# Patient Record
Sex: FEMALE | Race: WHITE | NOT HISPANIC OR LATINO | Employment: PART TIME | URBAN - METROPOLITAN AREA
[De-identification: names, ages, dates, MRNs, and addresses within clinical notes are randomized per-mention and may not be internally consistent; named-entity substitution may affect disease eponyms.]

---

## 2018-03-30 ENCOUNTER — WALK IN (OUTPATIENT)
Dept: URGENT CARE | Age: 68
End: 2018-03-30

## 2018-03-30 VITALS
SYSTOLIC BLOOD PRESSURE: 134 MMHG | WEIGHT: 146.8 LBS | HEART RATE: 71 BPM | DIASTOLIC BLOOD PRESSURE: 60 MMHG | TEMPERATURE: 97.8 F

## 2018-03-30 DIAGNOSIS — K04.7 TOOTH ABSCESS: Primary | ICD-10-CM

## 2018-03-30 PROCEDURE — 99202 OFFICE O/P NEW SF 15 MIN: CPT | Performed by: FAMILY MEDICINE

## 2018-03-30 RX ORDER — CHLORHEXIDINE GLUCONATE ORAL RINSE 1.2 MG/ML
15 SOLUTION DENTAL 2 TIMES DAILY
Qty: 473 ML | Refills: 0 | Status: SHIPPED | OUTPATIENT
Start: 2018-03-30

## 2018-03-30 RX ORDER — CLINDAMYCIN HYDROCHLORIDE 150 MG/1
300 CAPSULE ORAL 3 TIMES DAILY
Qty: 42 CAPSULE | Refills: 0 | Status: SHIPPED | OUTPATIENT
Start: 2018-03-30 | End: 2018-04-06

## 2023-03-27 PROBLEM — M35.9 DISORDER OF CONNECTIVE TISSUE (MULTI): Status: ACTIVE | Noted: 2023-03-27

## 2023-03-27 PROBLEM — S99.919A ANKLE INJURY: Status: ACTIVE | Noted: 2023-03-27

## 2023-03-27 PROBLEM — K21.9 GERD (GASTROESOPHAGEAL REFLUX DISEASE): Status: ACTIVE | Noted: 2023-03-27

## 2023-03-27 PROBLEM — M25.569 KNEE PAIN: Status: ACTIVE | Noted: 2023-03-27

## 2023-03-27 PROBLEM — M25.532 LEFT WRIST PAIN: Status: ACTIVE | Noted: 2023-03-27
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
## 2023-03-27 PROBLEM — R05.9 COUGH: Status: ACTIVE | Noted: 2023-03-27

## 2023-03-27 PROBLEM — H25.042 POSTERIOR SUBCAPSULAR POLAR AGE-RELATED CATARACT OF LEFT EYE: Status: ACTIVE | Noted: 2023-03-27

## 2023-03-27 PROBLEM — M54.9 ACUTE BACK PAIN: Status: ACTIVE | Noted: 2023-03-27

## 2023-03-27 PROBLEM — S51.819A LACERATION OF FOREARM: Status: ACTIVE | Noted: 2023-03-27

## 2023-03-27 PROBLEM — E04.1 THYROID NODULE: Status: ACTIVE | Noted: 2023-03-27

## 2023-03-27 PROBLEM — H25.12 AGE-RELATED NUCLEAR CATARACT OF LEFT EYE: Status: ACTIVE | Noted: 2023-03-27

## 2023-03-27 PROBLEM — H25.041 POSTERIOR SUBCAPSULAR POLAR AGE-RELATED CATARACT OF RIGHT EYE: Status: ACTIVE | Noted: 2023-03-27

## 2023-03-27 PROBLEM — R92.30 DENSE BREAST TISSUE ON MAMMOGRAM: Status: ACTIVE | Noted: 2023-03-27
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
## 2023-03-27 PROBLEM — E78.00 HYPERCHOLESTEROLEMIA: Status: ACTIVE | Noted: 2023-03-27

## 2023-03-27 PROBLEM — H92.09 EAR PAIN: Status: ACTIVE | Noted: 2023-03-27

## 2023-03-27 PROBLEM — R50.9 FEVER: Status: ACTIVE | Noted: 2023-03-27

## 2023-03-27 PROBLEM — M25.551 HIP PAIN, BILATERAL: Status: ACTIVE | Noted: 2023-03-27

## 2023-03-27 PROBLEM — T14.90XA TRAUMA: Status: ACTIVE | Noted: 2023-03-27

## 2023-03-27 PROBLEM — M79.605 PAIN OF LEFT LOWER EXTREMITY: Status: ACTIVE | Noted: 2023-03-27

## 2023-03-27 PROBLEM — J11.1 INFLUENZA: Status: ACTIVE | Noted: 2023-03-27

## 2023-03-27 PROBLEM — M79.671 PAIN IN BOTH FEET: Status: ACTIVE | Noted: 2023-03-27

## 2023-03-27 PROBLEM — M18.11 ARTHRITIS OF CARPOMETACARPAL (CMC) JOINT OF RIGHT THUMB: Status: ACTIVE | Noted: 2023-03-27

## 2023-03-27 PROBLEM — J02.9 SORE THROAT: Status: ACTIVE | Noted: 2023-03-27

## 2023-03-27 PROBLEM — M25.552 HIP PAIN, LEFT: Status: ACTIVE | Noted: 2023-03-27

## 2023-03-27 PROBLEM — R19.7 DIARRHEA: Status: ACTIVE | Noted: 2023-03-27

## 2023-03-27 PROBLEM — M06.9 RHEUMATOID ARTHRITIS (MULTI): Status: ACTIVE | Noted: 2023-03-27

## 2023-03-27 PROBLEM — M79.605 LEFT LEG PAIN: Status: ACTIVE | Noted: 2023-03-27

## 2023-03-27 PROBLEM — M79.10 MYALGIA: Status: ACTIVE | Noted: 2023-03-27

## 2023-03-27 PROBLEM — H25.11 AGE-RELATED NUCLEAR CATARACT OF RIGHT EYE: Status: ACTIVE | Noted: 2023-03-27

## 2023-03-27 PROBLEM — M79.644 PAIN OF RIGHT THUMB: Status: ACTIVE | Noted: 2023-03-27

## 2023-03-27 PROBLEM — E55.9 VITAMIN D DEFICIENCY: Status: ACTIVE | Noted: 2023-03-27

## 2023-03-27 PROBLEM — R25.2 CRAMPING OF HANDS: Status: ACTIVE | Noted: 2023-03-27

## 2023-03-27 PROBLEM — M79.672 PAIN IN BOTH FEET: Status: ACTIVE | Noted: 2023-03-27

## 2023-03-27 PROBLEM — M25.552 HIP PAIN, BILATERAL: Status: ACTIVE | Noted: 2023-03-27

## 2023-03-27 PROBLEM — M79.89 SWELLING OF LEFT HAND: Status: ACTIVE | Noted: 2023-03-27

## 2023-03-27 PROBLEM — R53.83 FATIGUE: Status: ACTIVE | Noted: 2023-03-27

## 2023-03-27 PROBLEM — N28.9 RENAL DISORDER: Status: ACTIVE | Noted: 2023-03-27

## 2023-03-27 PROBLEM — R06.00 DYSPNEA: Status: ACTIVE | Noted: 2023-03-27

## 2023-03-27 PROBLEM — M81.0 OSTEOPOROSIS, POST-MENOPAUSAL: Status: ACTIVE | Noted: 2023-03-27

## 2023-03-27 PROBLEM — R76.8 ANA POSITIVE: Status: ACTIVE | Noted: 2023-03-27

## 2023-03-28 ENCOUNTER — OFFICE VISIT (OUTPATIENT)
Dept: PRIMARY CARE | Facility: CLINIC | Age: 73
End: 2023-03-28
Payer: MEDICARE

## 2023-03-28 ENCOUNTER — TELEPHONE (OUTPATIENT)
Dept: PRIMARY CARE | Facility: CLINIC | Age: 73
End: 2023-03-28

## 2023-03-28 VITALS
SYSTOLIC BLOOD PRESSURE: 112 MMHG | OXYGEN SATURATION: 96 % | HEART RATE: 77 BPM | DIASTOLIC BLOOD PRESSURE: 62 MMHG | WEIGHT: 152 LBS | BODY MASS INDEX: 23.11 KG/M2

## 2023-03-28 DIAGNOSIS — S22.20XA CLOSED FRACTURE OF STERNUM, UNSPECIFIED PORTION OF STERNUM, INITIAL ENCOUNTER: Primary | ICD-10-CM
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
## 2023-03-28 DIAGNOSIS — Z79.01 ANTICOAGULATED: ICD-10-CM

## 2023-03-28 DIAGNOSIS — T14.8XXA HEMATOMA: ICD-10-CM

## 2023-03-28 PROCEDURE — 1159F MED LIST DOCD IN RCRD: CPT | Performed by: INTERNAL MEDICINE

## 2023-03-28 PROCEDURE — 1036F TOBACCO NON-USER: CPT | Performed by: INTERNAL MEDICINE

## 2023-03-28 PROCEDURE — 1160F RVW MEDS BY RX/DR IN RCRD: CPT | Performed by: INTERNAL MEDICINE

## 2023-03-28 PROCEDURE — 99496 TRANSJ CARE MGMT HIGH F2F 7D: CPT | Performed by: INTERNAL MEDICINE

## 2023-03-28 PROCEDURE — 99215 OFFICE O/P EST HI 40 MIN: CPT | Performed by: INTERNAL MEDICINE

## 2023-03-28 RX ORDER — OMEPRAZOLE 20 MG/1
CAPSULE, DELAYED RELEASE ORAL
COMMUNITY
Start: 2023-02-14 | End: 2023-08-22 | Stop reason: ALTCHOICE

## 2023-03-28 RX ORDER — ACETAMINOPHEN 500 MG
TABLET ORAL EVERY 8 HOURS
COMMUNITY
Start: 2023-03-20

## 2023-03-28 ASSESSMENT — ENCOUNTER SYMPTOMS
OCCASIONAL FEELINGS OF UNSTEADINESS: 0
DEPRESSION: 0
LOSS OF SENSATION IN FEET: 0
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
## 2023-05-01 DIAGNOSIS — T14.8XXA HEMATOMA: ICD-10-CM

## 2023-05-02 PROBLEM — R74.8 HIGH LEVEL OF CARDIAC MARKER: Status: ACTIVE | Noted: 2023-05-02

## 2023-05-02 PROBLEM — H68.019: Status: ACTIVE | Noted: 2023-05-02

## 2023-05-02 PROBLEM — S27.892A MEDIASTINAL HEMATOMA: Status: ACTIVE | Noted: 2023-05-02

## 2023-05-02 PROBLEM — S22.22XA CLOSED FRACTURE OF BODY OF STERNUM: Status: ACTIVE | Noted: 2023-05-02

## 2023-05-02 PROBLEM — S27.321A CONTUSION OF RIGHT LUNG: Status: ACTIVE | Noted: 2023-05-02

## 2023-05-02 PROBLEM — D41.01 NEOPLASM OF UNCERTAIN BEHAVIOR OF RIGHT KIDNEY: Status: ACTIVE | Noted: 2023-05-02

## 2023-05-02 PROBLEM — J20.9 ACUTE BRONCHITIS: Status: ACTIVE | Noted: 2023-05-02

## 2023-05-02 PROBLEM — I77.3 ARTERIAL FIBROMUSCULAR DYSPLASIA (CMS-HCC): Status: ACTIVE | Noted: 2023-05-02

## 2023-05-02 RX ORDER — VIT A/VIT C/VIT E/ZINC/COPPER 2148-113
TABLET ORAL
COMMUNITY

## 2023-05-03 ENCOUNTER — OFFICE VISIT (OUTPATIENT)
Dept: PRIMARY CARE | Facility: CLINIC | Age: 73
End: 2023-05-03
Payer: MEDICARE

## 2023-05-03 VITALS
SYSTOLIC BLOOD PRESSURE: 106 MMHG | DIASTOLIC BLOOD PRESSURE: 58 MMHG | OXYGEN SATURATION: 98 % | HEART RATE: 73 BPM | BODY MASS INDEX: 23.63 KG/M2 | WEIGHT: 155.4 LBS

## 2023-05-03 DIAGNOSIS — Z79.01 ANTICOAGULATED: ICD-10-CM

## 2023-05-03 DIAGNOSIS — K21.9 GASTROESOPHAGEAL REFLUX DISEASE WITHOUT ESOPHAGITIS: ICD-10-CM

## 2023-05-03 DIAGNOSIS — F43.10 PTSD (POST-TRAUMATIC STRESS DISORDER): Primary | ICD-10-CM

## 2023-05-03 DIAGNOSIS — S22.20XD CLOSED FRACTURE OF STERNUM WITH ROUTINE HEALING, UNSPECIFIED PORTION OF STERNUM, SUBSEQUENT ENCOUNTER: ICD-10-CM
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
## 2023-05-03 PROCEDURE — 1036F TOBACCO NON-USER: CPT | Performed by: INTERNAL MEDICINE

## 2023-05-03 PROCEDURE — 1160F RVW MEDS BY RX/DR IN RCRD: CPT | Performed by: INTERNAL MEDICINE

## 2023-05-03 PROCEDURE — 99214 OFFICE O/P EST MOD 30 MIN: CPT | Performed by: INTERNAL MEDICINE

## 2023-05-03 PROCEDURE — 1159F MED LIST DOCD IN RCRD: CPT | Performed by: INTERNAL MEDICINE

## 2023-05-03 RX ORDER — DULOXETIN HYDROCHLORIDE 30 MG/1
30 CAPSULE, DELAYED RELEASE ORAL DAILY
Qty: 30 CAPSULE | Refills: 0 | Status: SHIPPED | OUTPATIENT
Start: 2023-05-03 | End: 2023-06-05 | Stop reason: SDUPTHER
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
## 2023-05-28 DIAGNOSIS — T14.8XXA HEMATOMA: ICD-10-CM

## 2023-05-29 RX ORDER — APIXABAN 2.5 MG/1
TABLET, FILM COATED ORAL
Qty: 60 TABLET | Refills: 0 | Status: SHIPPED | OUTPATIENT
Start: 2023-05-29 | End: 2023-07-27 | Stop reason: SDUPTHER

## 2023-06-05 ENCOUNTER — OFFICE VISIT (OUTPATIENT)
Dept: PRIMARY CARE | Facility: CLINIC | Age: 73
End: 2023-06-05
Payer: MEDICARE

## 2023-06-05 VITALS
OXYGEN SATURATION: 96 % | WEIGHT: 153.4 LBS | BODY MASS INDEX: 23.32 KG/M2 | HEART RATE: 73 BPM | DIASTOLIC BLOOD PRESSURE: 72 MMHG | SYSTOLIC BLOOD PRESSURE: 130 MMHG

## 2023-06-05 DIAGNOSIS — K21.9 GASTROESOPHAGEAL REFLUX DISEASE WITHOUT ESOPHAGITIS: ICD-10-CM

## 2023-06-05 DIAGNOSIS — G89.29 CHRONIC PAIN OF LEFT KNEE: ICD-10-CM

## 2023-06-05 DIAGNOSIS — F43.10 PTSD (POST-TRAUMATIC STRESS DISORDER): Primary | ICD-10-CM

## 2023-06-05 DIAGNOSIS — I77.3 FIBROMUSCULAR DYSPLASIA (CMS-HCC): ICD-10-CM

## 2023-06-05 DIAGNOSIS — M25.562 CHRONIC PAIN OF LEFT KNEE: ICD-10-CM

## 2023-06-05 PROCEDURE — 1036F TOBACCO NON-USER: CPT | Performed by: INTERNAL MEDICINE

## 2023-06-05 PROCEDURE — 99214 OFFICE O/P EST MOD 30 MIN: CPT | Performed by: INTERNAL MEDICINE

## 2023-06-05 PROCEDURE — 1160F RVW MEDS BY RX/DR IN RCRD: CPT | Performed by: INTERNAL MEDICINE

## 2023-06-05 PROCEDURE — 1159F MED LIST DOCD IN RCRD: CPT | Performed by: INTERNAL MEDICINE

## 2023-06-05 RX ORDER — DULOXETIN HYDROCHLORIDE 30 MG/1
30 CAPSULE, DELAYED RELEASE ORAL DAILY
Qty: 90 CAPSULE | Refills: 0 | Status: SHIPPED | OUTPATIENT
Start: 2023-06-05 | End: 2023-09-11 | Stop reason: SDUPTHER
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
## 2023-06-19 ENCOUNTER — TELEPHONE (OUTPATIENT)
Dept: PRIMARY CARE | Facility: CLINIC | Age: 73
End: 2023-06-19
Payer: MEDICARE

## 2023-06-19 NOTE — TELEPHONE ENCOUNTER
Patient called in this morning wanting advice. Patient is having rectal bleeding/stomach pain patient headed to ER per MARITZA.

## 2023-06-23 ENCOUNTER — TELEPHONE (OUTPATIENT)
Dept: PRIMARY CARE | Facility: CLINIC | Age: 73
End: 2023-06-23
Payer: MEDICARE

## 2023-06-23 PROBLEM — N39.0 UTI (URINARY TRACT INFECTION): Status: ACTIVE | Noted: 2023-06-23

## 2023-06-23 PROBLEM — K62.5 RECTAL BLEEDING: Status: ACTIVE | Noted: 2023-06-23

## 2023-06-23 PROBLEM — R10.9 ABDOMINAL PAIN: Status: ACTIVE | Noted: 2023-06-23

## 2023-06-23 RX ORDER — CEPHALEXIN 500 MG/1
500 CAPSULE ORAL EVERY 12 HOURS
COMMUNITY
End: 2023-08-22 | Stop reason: ALTCHOICE

## 2023-06-23 NOTE — TELEPHONE ENCOUNTER
Patient states that Duloxetine medication may be the cause of diarrhea, liquid bowel movements, nausea, stomach issues.    She is stopping this medication and is requesting results of tests that were run at ED on 6-.    Patient will be scheduled to come into office this week to go over these issues.

## 2023-06-26 ENCOUNTER — OFFICE VISIT (OUTPATIENT)
Dept: PRIMARY CARE | Facility: CLINIC | Age: 73
End: 2023-06-26
Payer: MEDICARE

## 2023-06-26 VITALS
DIASTOLIC BLOOD PRESSURE: 70 MMHG | BODY MASS INDEX: 22.62 KG/M2 | SYSTOLIC BLOOD PRESSURE: 112 MMHG | OXYGEN SATURATION: 98 % | WEIGHT: 148.8 LBS | HEART RATE: 70 BPM

## 2023-06-26 DIAGNOSIS — R19.7 DIARRHEA, UNSPECIFIED TYPE: Primary | ICD-10-CM

## 2023-06-26 DIAGNOSIS — F43.10 PTSD (POST-TRAUMATIC STRESS DISORDER): ICD-10-CM

## 2023-06-26 DIAGNOSIS — S22.20XD CLOSED FRACTURE OF STERNUM WITH ROUTINE HEALING, UNSPECIFIED PORTION OF STERNUM, SUBSEQUENT ENCOUNTER: ICD-10-CM

## 2023-06-26 DIAGNOSIS — K21.9 GASTROESOPHAGEAL REFLUX DISEASE WITHOUT ESOPHAGITIS: ICD-10-CM

## 2023-06-26 PROCEDURE — 1036F TOBACCO NON-USER: CPT | Performed by: INTERNAL MEDICINE

## 2023-06-26 PROCEDURE — 99214 OFFICE O/P EST MOD 30 MIN: CPT | Performed by: INTERNAL MEDICINE

## 2023-06-26 PROCEDURE — 1160F RVW MEDS BY RX/DR IN RCRD: CPT | Performed by: INTERNAL MEDICINE

## 2023-06-26 PROCEDURE — 3008F BODY MASS INDEX DOCD: CPT | Performed by: INTERNAL MEDICINE

## 2023-06-26 PROCEDURE — 1159F MED LIST DOCD IN RCRD: CPT | Performed by: INTERNAL MEDICINE

## 2023-06-27 ENCOUNTER — LAB (OUTPATIENT)
Dept: LAB | Facility: LAB | Age: 73
End: 2023-06-27
Payer: MEDICARE

## 2023-06-27 DIAGNOSIS — R19.7 DIARRHEA, UNSPECIFIED TYPE: ICD-10-CM

## 2023-06-27 PROCEDURE — 87493 C DIFF AMPLIFIED PROBE: CPT

## 2023-06-28 LAB — C. DIFFICILE TOXIN, PCR: NOT DETECTED

## 2023-07-27 DIAGNOSIS — T14.8XXA HEMATOMA: ICD-10-CM

## 2023-08-10 PROBLEM — M25.562 KNEE PAIN, LEFT: Status: ACTIVE | Noted: 2023-08-10

## 2023-08-10 PROBLEM — I77.3 FIBROMUSCULAR DYSPLASIA (CMS-HCC): Status: ACTIVE | Noted: 2023-08-10

## 2023-08-10 PROBLEM — I65.23 BILATERAL CAROTID ARTERY STENOSIS: Status: ACTIVE | Noted: 2023-08-10

## 2023-08-10 PROBLEM — I70.0 AORTIC ATHEROSCLEROSIS (CMS-HCC): Status: ACTIVE | Noted: 2023-08-10

## 2023-08-10 PROBLEM — I83.93 VARICOSE VEINS OF LEGS: Status: ACTIVE | Noted: 2023-08-10

## 2023-08-10 PROBLEM — R29.2 HYPERREFLEXIC: Status: ACTIVE | Noted: 2023-08-10

## 2023-08-10 RX ORDER — ASPIRIN 81 MG/1
TABLET ORAL
COMMUNITY

## 2023-08-16 PROBLEM — E04.1 NODULAR THYROID DISEASE: Status: ACTIVE | Noted: 2023-08-16

## 2023-08-16 PROBLEM — E04.2 NONTOXIC MULTINODULAR GOITER: Status: ACTIVE | Noted: 2023-08-16

## 2023-08-16 PROBLEM — N95.1 MENOPAUSAL SYMPTOM: Status: ACTIVE | Noted: 2023-08-16

## 2023-08-17 PROBLEM — H90.3 SENSORINEURAL HEARING LOSS, BILATERAL: Status: ACTIVE | Noted: 2023-08-17

## 2023-08-22 ENCOUNTER — LAB (OUTPATIENT)
Dept: LAB | Facility: LAB | Age: 73
End: 2023-08-22
Payer: MEDICARE

## 2023-08-22 ENCOUNTER — OFFICE VISIT (OUTPATIENT)
Dept: PRIMARY CARE | Facility: CLINIC | Age: 73
End: 2023-08-22
Payer: MEDICARE

## 2023-08-22 VITALS
BODY MASS INDEX: 23.69 KG/M2 | HEIGHT: 66 IN | WEIGHT: 147.4 LBS | SYSTOLIC BLOOD PRESSURE: 136 MMHG | HEART RATE: 64 BPM | OXYGEN SATURATION: 98 % | DIASTOLIC BLOOD PRESSURE: 82 MMHG

## 2023-08-22 DIAGNOSIS — Z00.00 ROUTINE GENERAL MEDICAL EXAMINATION AT HEALTH CARE FACILITY: ICD-10-CM

## 2023-08-22 DIAGNOSIS — Z00.00 ANNUAL PHYSICAL EXAM: ICD-10-CM

## 2023-08-22 DIAGNOSIS — E55.9 VITAMIN D DEFICIENCY: ICD-10-CM

## 2023-08-22 DIAGNOSIS — Z12.31 ENCOUNTER FOR SCREENING MAMMOGRAM FOR MALIGNANT NEOPLASM OF BREAST: ICD-10-CM

## 2023-08-22 DIAGNOSIS — Z00.00 ENCOUNTER FOR ANNUAL WELLNESS VISIT (AWV) IN MEDICARE PATIENT: Primary | ICD-10-CM

## 2023-08-22 DIAGNOSIS — R53.83 OTHER FATIGUE: ICD-10-CM

## 2023-08-22 DIAGNOSIS — F43.10 PTSD (POST-TRAUMATIC STRESS DISORDER): ICD-10-CM

## 2023-08-22 DIAGNOSIS — I77.3 FIBROMUSCULAR DYSPLASIA (CMS-HCC): ICD-10-CM

## 2023-08-22 LAB
ALANINE AMINOTRANSFERASE (SGPT) (U/L) IN SER/PLAS: 12 U/L (ref 7–45)
ALBUMIN (G/DL) IN SER/PLAS: 4.2 G/DL (ref 3.4–5)
ALKALINE PHOSPHATASE (U/L) IN SER/PLAS: 60 U/L (ref 33–136)
ANION GAP IN SER/PLAS: 10 MMOL/L (ref 10–20)
ASPARTATE AMINOTRANSFERASE (SGOT) (U/L) IN SER/PLAS: 22 U/L (ref 9–39)
BASOPHILS (10*3/UL) IN BLOOD BY AUTOMATED COUNT: 0.03 X10E9/L (ref 0–0.1)
BASOPHILS/100 LEUKOCYTES IN BLOOD BY AUTOMATED COUNT: 0.6 % (ref 0–2)
BILIRUBIN TOTAL (MG/DL) IN SER/PLAS: 0.6 MG/DL (ref 0–1.2)
CALCIDIOL (25 OH VITAMIN D3) (NG/ML) IN SER/PLAS: 36 NG/ML
CALCIUM (MG/DL) IN SER/PLAS: 9.6 MG/DL (ref 8.6–10.3)
CARBON DIOXIDE, TOTAL (MMOL/L) IN SER/PLAS: 31 MMOL/L (ref 21–32)
CHLORIDE (MMOL/L) IN SER/PLAS: 103 MMOL/L (ref 98–107)
CHOLESTEROL (MG/DL) IN SER/PLAS: 178 MG/DL (ref 0–199)
CHOLESTEROL IN HDL (MG/DL) IN SER/PLAS: 61.5 MG/DL
CHOLESTEROL/HDL RATIO: 2.9
CREATININE (MG/DL) IN SER/PLAS: 0.79 MG/DL (ref 0.5–1.05)
EOSINOPHILS (10*3/UL) IN BLOOD BY AUTOMATED COUNT: 0.08 X10E9/L (ref 0–0.4)
EOSINOPHILS/100 LEUKOCYTES IN BLOOD BY AUTOMATED COUNT: 1.7 % (ref 0–6)
ERYTHROCYTE DISTRIBUTION WIDTH (RATIO) BY AUTOMATED COUNT: 12.5 % (ref 11.5–14.5)
ERYTHROCYTE MEAN CORPUSCULAR HEMOGLOBIN CONCENTRATION (G/DL) BY AUTOMATED: 32 G/DL (ref 32–36)
ERYTHROCYTE MEAN CORPUSCULAR VOLUME (FL) BY AUTOMATED COUNT: 93 FL (ref 80–100)
ERYTHROCYTES (10*6/UL) IN BLOOD BY AUTOMATED COUNT: 4.39 X10E12/L (ref 4–5.2)
GFR FEMALE: 79 ML/MIN/1.73M2
GLUCOSE (MG/DL) IN SER/PLAS: 93 MG/DL (ref 74–99)
HEMATOCRIT (%) IN BLOOD BY AUTOMATED COUNT: 41 % (ref 36–46)
HEMOGLOBIN (G/DL) IN BLOOD: 13.1 G/DL (ref 12–16)
IMMATURE GRANULOCYTES/100 LEUKOCYTES IN BLOOD BY AUTOMATED COUNT: 0.2 % (ref 0–0.9)
LDL: 98 MG/DL (ref 0–99)
LEUKOCYTES (10*3/UL) IN BLOOD BY AUTOMATED COUNT: 4.7 X10E9/L (ref 4.4–11.3)
LYMPHOCYTES (10*3/UL) IN BLOOD BY AUTOMATED COUNT: 1.4 X10E9/L (ref 0.8–3)
LYMPHOCYTES/100 LEUKOCYTES IN BLOOD BY AUTOMATED COUNT: 29.7 % (ref 13–44)
MONOCYTES (10*3/UL) IN BLOOD BY AUTOMATED COUNT: 0.38 X10E9/L (ref 0.05–0.8)
MONOCYTES/100 LEUKOCYTES IN BLOOD BY AUTOMATED COUNT: 8.1 % (ref 2–10)
NEUTROPHILS (10*3/UL) IN BLOOD BY AUTOMATED COUNT: 2.81 X10E9/L (ref 1.6–5.5)
NEUTROPHILS/100 LEUKOCYTES IN BLOOD BY AUTOMATED COUNT: 59.7 % (ref 40–80)
PLATELETS (10*3/UL) IN BLOOD AUTOMATED COUNT: 197 X10E9/L (ref 150–450)
POTASSIUM (MMOL/L) IN SER/PLAS: 4.2 MMOL/L (ref 3.5–5.3)
PROTEIN TOTAL: 7.3 G/DL (ref 6.4–8.2)
SODIUM (MMOL/L) IN SER/PLAS: 140 MMOL/L (ref 136–145)
THYROTROPIN (MIU/L) IN SER/PLAS BY DETECTION LIMIT <= 0.05 MIU/L: 0.92 MIU/L (ref 0.44–3.98)
TRIGLYCERIDE (MG/DL) IN SER/PLAS: 94 MG/DL (ref 0–149)
UREA NITROGEN (MG/DL) IN SER/PLAS: 17 MG/DL (ref 6–23)
VLDL: 19 MG/DL (ref 0–40)

## 2023-08-22 PROCEDURE — 84443 ASSAY THYROID STIM HORMONE: CPT

## 2023-08-22 PROCEDURE — 80061 LIPID PANEL: CPT

## 2023-08-22 PROCEDURE — 1036F TOBACCO NON-USER: CPT | Performed by: INTERNAL MEDICINE

## 2023-08-22 PROCEDURE — 99214 OFFICE O/P EST MOD 30 MIN: CPT | Performed by: INTERNAL MEDICINE

## 2023-08-22 PROCEDURE — 1126F AMNT PAIN NOTED NONE PRSNT: CPT | Performed by: INTERNAL MEDICINE

## 2023-08-22 PROCEDURE — 85025 COMPLETE CBC W/AUTO DIFF WBC: CPT

## 2023-08-22 PROCEDURE — 82306 VITAMIN D 25 HYDROXY: CPT

## 2023-08-22 PROCEDURE — 1170F FXNL STATUS ASSESSED: CPT | Performed by: INTERNAL MEDICINE

## 2023-08-22 PROCEDURE — 80053 COMPREHEN METABOLIC PANEL: CPT

## 2023-08-22 PROCEDURE — 1160F RVW MEDS BY RX/DR IN RCRD: CPT | Performed by: INTERNAL MEDICINE

## 2023-08-22 PROCEDURE — G0439 PPPS, SUBSEQ VISIT: HCPCS | Performed by: INTERNAL MEDICINE

## 2023-08-22 PROCEDURE — 1159F MED LIST DOCD IN RCRD: CPT | Performed by: INTERNAL MEDICINE

## 2023-08-22 PROCEDURE — 3008F BODY MASS INDEX DOCD: CPT | Performed by: INTERNAL MEDICINE

## 2023-08-22 PROCEDURE — 36415 COLL VENOUS BLD VENIPUNCTURE: CPT

## 2023-08-22 ASSESSMENT — ACTIVITIES OF DAILY LIVING (ADL)
TAKING_MEDICATION: INDEPENDENT
GROCERY_SHOPPING: INDEPENDENT
BATHING: INDEPENDENT
DRESSING: INDEPENDENT
MANAGING_FINANCES: INDEPENDENT
DOING_HOUSEWORK: INDEPENDENT

## 2023-08-22 ASSESSMENT — PATIENT HEALTH QUESTIONNAIRE - PHQ9
10. IF YOU CHECKED OFF ANY PROBLEMS, HOW DIFFICULT HAVE THESE PROBLEMS MADE IT FOR YOU TO DO YOUR WORK, TAKE CARE OF THINGS AT HOME, OR GET ALONG WITH OTHER PEOPLE: NOT DIFFICULT AT ALL
SUM OF ALL RESPONSES TO PHQ9 QUESTIONS 1 AND 2: 1
1. LITTLE INTEREST OR PLEASURE IN DOING THINGS: NOT AT ALL
1. LITTLE INTEREST OR PLEASURE IN DOING THINGS: NOT AT ALL
2. FEELING DOWN, DEPRESSED OR HOPELESS: SEVERAL DAYS

## 2023-08-22 ASSESSMENT — ENCOUNTER SYMPTOMS
LOSS OF SENSATION IN FEET: 0
OCCASIONAL FEELINGS OF UNSTEADINESS: 0
DEPRESSION: 0
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
## 2023-08-24 ENCOUNTER — HOSPITAL ENCOUNTER (OUTPATIENT)
Dept: DATA CONVERSION | Facility: HOSPITAL | Age: 73
End: 2023-08-24

## 2023-08-24 DIAGNOSIS — Z12.31 ENCOUNTER FOR SCREENING MAMMOGRAM FOR MALIGNANT NEOPLASM OF BREAST: ICD-10-CM

## 2023-08-29 ENCOUNTER — APPOINTMENT (OUTPATIENT)
Dept: PRIMARY CARE | Facility: CLINIC | Age: 73
End: 2023-08-29
Payer: MEDICARE

## 2023-09-11 DIAGNOSIS — F43.10 PTSD (POST-TRAUMATIC STRESS DISORDER): ICD-10-CM

## 2023-09-12 RX ORDER — DULOXETIN HYDROCHLORIDE 30 MG/1
30 CAPSULE, DELAYED RELEASE ORAL DAILY
Qty: 90 CAPSULE | Refills: 0 | Status: SHIPPED | OUTPATIENT
Start: 2023-09-12 | End: 2023-12-19 | Stop reason: SDUPTHER

## 2023-10-10 ENCOUNTER — HOSPITAL ENCOUNTER (OUTPATIENT)
Dept: RADIOLOGY | Facility: HOSPITAL | Age: 73
Discharge: HOME | End: 2023-10-10
Payer: MEDICARE

## 2023-10-10 VITALS — HEIGHT: 68 IN | WEIGHT: 144 LBS | BODY MASS INDEX: 21.82 KG/M2

## 2023-10-10 DIAGNOSIS — Z12.31 ENCOUNTER FOR SCREENING MAMMOGRAM FOR MALIGNANT NEOPLASM OF BREAST: ICD-10-CM

## 2023-10-11 ENCOUNTER — APPOINTMENT (OUTPATIENT)
Dept: RADIOLOGY | Facility: HOSPITAL | Age: 73
End: 2023-10-11
Payer: MEDICARE

## 2023-10-19 ENCOUNTER — TELEPHONE (OUTPATIENT)
Dept: PRIMARY CARE | Facility: CLINIC | Age: 73
End: 2023-10-19
Payer: MEDICARE
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
## 2023-10-26 ENCOUNTER — HOSPITAL ENCOUNTER (OUTPATIENT)
Dept: RADIOLOGY | Facility: HOSPITAL | Age: 73
Discharge: HOME | End: 2023-10-26
Payer: MEDICARE

## 2023-10-26 DIAGNOSIS — I77.3 ARTERIAL FIBROMUSCULAR DYSPLASIA (CMS-HCC): ICD-10-CM

## 2023-10-26 PROCEDURE — 70544 MR ANGIOGRAPHY HEAD W/O DYE: CPT

## 2023-11-10 ENCOUNTER — APPOINTMENT (OUTPATIENT)
Dept: ORTHOPEDIC SURGERY | Facility: CLINIC | Age: 73
End: 2023-11-10
Payer: MEDICARE

## 2023-11-10 ENCOUNTER — OFFICE VISIT (OUTPATIENT)
Dept: ORTHOPEDIC SURGERY | Facility: CLINIC | Age: 73
End: 2023-11-10
Payer: MEDICARE

## 2023-11-10 ENCOUNTER — HOSPITAL ENCOUNTER (OUTPATIENT)
Dept: RADIOLOGY | Facility: HOSPITAL | Age: 73
Discharge: HOME | End: 2023-11-10
Payer: MEDICARE

## 2023-11-10 DIAGNOSIS — M25.552 HIP PAIN, LEFT: ICD-10-CM

## 2023-11-10 DIAGNOSIS — M70.62 GREATER TROCHANTERIC BURSITIS OF LEFT HIP: Primary | ICD-10-CM

## 2023-11-10 PROCEDURE — 73502 X-RAY EXAM HIP UNI 2-3 VIEWS: CPT | Mod: LT

## 2023-11-10 PROCEDURE — 1160F RVW MEDS BY RX/DR IN RCRD: CPT | Performed by: ORTHOPAEDIC SURGERY

## 2023-11-10 PROCEDURE — 1126F AMNT PAIN NOTED NONE PRSNT: CPT | Performed by: ORTHOPAEDIC SURGERY

## 2023-11-10 PROCEDURE — 1159F MED LIST DOCD IN RCRD: CPT | Performed by: ORTHOPAEDIC SURGERY

## 2023-11-10 PROCEDURE — 20610 DRAIN/INJ JOINT/BURSA W/O US: CPT | Performed by: ORTHOPAEDIC SURGERY

## 2023-11-10 PROCEDURE — 1036F TOBACCO NON-USER: CPT | Performed by: ORTHOPAEDIC SURGERY

## 2023-11-10 PROCEDURE — 99214 OFFICE O/P EST MOD 30 MIN: CPT | Performed by: ORTHOPAEDIC SURGERY

## 2023-11-10 PROCEDURE — 73502 X-RAY EXAM HIP UNI 2-3 VIEWS: CPT | Mod: LEFT SIDE | Performed by: RADIOLOGY

## 2023-11-10 PROCEDURE — 3008F BODY MASS INDEX DOCD: CPT | Performed by: ORTHOPAEDIC SURGERY

## 2023-11-10 RX ORDER — DICLOFENAC SODIUM 10 MG/G
4 GEL TOPICAL 4 TIMES DAILY
Qty: 480 G | Refills: 0 | Status: SHIPPED | OUTPATIENT
Start: 2023-11-10 | End: 2023-12-10

## 2023-11-10 NOTE — PROGRESS NOTES
This is a consultation from Dr. Diamante Tomas MD for   Chief Complaint   Patient presents with    Left Knee - Pain     WOULD LIKE INJECTION    Left Hip - Pain       This is a 73 y.o. female who presents for follow-up for her left knee and a new complaint of left hip pain.  Her left knee she has a chronic issue been going on for a while but is recently exacerbated.  Sharpsafe pain over the medial knee worse with walking relieved with rest.  She is also had pain in the lateral side of her hip.  This is going on for a while, it is a chronic issue but recently exacerbated.  Sharp type pain over the lateral aspect of the hip.  Never had any injections or surgery on the hip.  She takes anti-inflammatories which are occasionally helpful.        Physical Exam    There has been no interval change in this patient's past medical, surgical, medications, allergies, family history or social history since the most recent visit to a provider within our department. 14 point review of systems was performed, reviewed, and negative except for pertinent positives documented in the history of present illness.     Constitutional: well developed, well nourished female in no acute distress  Psychiatric: normal mood, appropriate affect  Eyes: sclera anicteric  HENT: normocephalic/atraumatic  CV: regular rate and rhythm   Respiratory: non labored breathing  Integumentary: no rash  Neurological: moves all extremities    Left hip exam: skin normal, no abrasions, wounds, or lacerations.  Tender over greater trochanter. negative log roll, negative zak's test. flexion to 90 degrees without pain. no pain with flexion abduction and external rotation, no pain with flexion adduction and internal rotation. neurovascularly intact distally    Left knee exam: skin intact no lacerations or abrations.  1+ effusion.  Tender medial joint line. negative log roll negative patellar grind. ROM 0-120. stable to varus and valgus stress at 0 and 30  degrees. negative lachman negative posterior drawer negative dada. 5/5 ehl/fhl/gs/ta. silt s/s/sp/dp/t. 2+ dp/pt        Xrays were ordered by me, they were reviewed and independently interpreted by me today, they show moderate degenerative changes left knee, mild degenerative changes left hip    Procedure Note:    Diagnosis: left knee arthritis  Procedure: left knee injection    Verbal consent was obtained from the patient, risks benefits and alternatives were discussed. We discussed the risks and benefits and potential morbidity related to the treatment, and to the prescription medication administered in the injectionA timeout was performed, and the correct patient and site of injection were identified and verified. The lateral side of the knee was sterilized with Betadine, and anesthetized with ethyl chloride spray. The left knee was injected with 1ml kenalog and 4ml lidocaine in the usual fashion. A sterile Band-Aid was placed. The patient tolerated the procedure well with no immediate complications. Standard post injection precautions and instructions were given.        Procedures      Impression/Plan: This is a 73 y.o. female with left knee arthritis and left hip greater trochanteric pain syndrome.  I had an in depth discussion with the patient regarding treatment options for arthritis and their relative risks and benefits. We reviewed surgical and nonsurgical option for treatment. Treatments include anti inflammatory medications, physical therapy, weight loss, activity modification, use of assistive devices, injection therapies. We discussed current prescriptions and risks and benefits of continuation of prescription medication as apporpriate. We discussed that arthritis is often progressive over time, an in end stage arthritis surgical interventions can be considered, including arthroplasty. All questions were answered and the patient voiced their understanding.  I discussed the risks and benefits of the  "various treatment options with the patient in detail, treatments for greater trochanteric pain syndrome include use of oral anti-inflammatory medications, use of a cane in the opposite hand, physical therapy, activity modification, and cortisone injection.  I will see her back.    BMI Readings from Last 1 Encounters:   10/10/23 21.90 kg/m²      Lab Results   Component Value Date    CREATININE 0.79 08/22/2023     Tobacco Use: Low Risk  (11/10/2023)    Patient History     Smoking Tobacco Use: Never     Smokeless Tobacco Use: Never     Passive Exposure: Never      MELD 3.0: 8 at 3/15/2023  8:57 AM  MELD-Na: 7 at 3/15/2023  8:57 AM  Calculated from:  Serum Creatinine: 0.68 mg/dL (Using min of 1 mg/dL) at 3/15/2023  8:57 AM  Serum Sodium: 145 mmol/L (Using max of 137 mmol/L) at 3/15/2023  8:57 AM  Total Bilirubin: 0.4 mg/dL (Using min of 1 mg/dL) at 3/13/2023  7:49 PM  Serum Albumin: 4.0 g/dL (Using max of 3.5 g/dL) at 3/13/2023  7:49 PM  INR(ratio): 1.1 at 3/13/2023  8:11 PM  Age at listing (hypothetical): 73 years  Sex: Female at 3/15/2023  8:57 AM       Lab Results   Component Value Date    HGBA1C 5.3 08/12/2021     No results found for: \"STAPHMRSASCR\"  "

## 2023-11-17 ENCOUNTER — APPOINTMENT (OUTPATIENT)
Dept: ORTHOPEDIC SURGERY | Facility: CLINIC | Age: 73
End: 2023-11-17
Payer: MEDICARE

## 2023-12-12 RX ORDER — TRIAMCINOLONE ACETONIDE 40 MG/ML
40 INJECTION, SUSPENSION INTRA-ARTICULAR; INTRAMUSCULAR ONCE
Status: COMPLETED | OUTPATIENT
Start: 2023-12-12 | End: 2023-12-12

## 2023-12-12 RX ADMIN — TRIAMCINOLONE ACETONIDE 40 MG: 40 INJECTION, SUSPENSION INTRA-ARTICULAR; INTRAMUSCULAR at 17:49

## 2023-12-14 PROBLEM — R05.9 COUGH: Status: RESOLVED | Noted: 2023-03-27 | Resolved: 2023-12-14

## 2023-12-14 PROBLEM — S51.819A LACERATION OF FOREARM: Status: RESOLVED | Noted: 2023-03-27 | Resolved: 2023-12-14

## 2023-12-14 PROBLEM — N39.0 UTI (URINARY TRACT INFECTION): Status: RESOLVED | Noted: 2023-06-23 | Resolved: 2023-12-14
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
## 2023-12-14 PROBLEM — S99.919A ANKLE INJURY: Status: RESOLVED | Noted: 2023-03-27 | Resolved: 2023-12-14

## 2023-12-19 ENCOUNTER — OFFICE VISIT (OUTPATIENT)
Dept: PRIMARY CARE | Facility: CLINIC | Age: 73
End: 2023-12-19
Payer: MEDICARE

## 2023-12-19 VITALS
OXYGEN SATURATION: 99 % | BODY MASS INDEX: 22.37 KG/M2 | HEART RATE: 84 BPM | SYSTOLIC BLOOD PRESSURE: 136 MMHG | DIASTOLIC BLOOD PRESSURE: 74 MMHG | WEIGHT: 147.6 LBS

## 2023-12-19 DIAGNOSIS — F43.10 PTSD (POST-TRAUMATIC STRESS DISORDER): Primary | ICD-10-CM

## 2023-12-19 DIAGNOSIS — S22.20XS CLOSED FRACTURE OF STERNUM, UNSPECIFIED PORTION OF STERNUM, SEQUELA: ICD-10-CM

## 2023-12-19 DIAGNOSIS — I77.3 FIBROMUSCULAR DYSPLASIA (CMS-HCC): ICD-10-CM

## 2023-12-19 DIAGNOSIS — K21.9 GASTROESOPHAGEAL REFLUX DISEASE WITHOUT ESOPHAGITIS: ICD-10-CM

## 2023-12-19 PROCEDURE — G0446 INTENS BEHAVE THER CARDIO DX: HCPCS | Performed by: INTERNAL MEDICINE

## 2023-12-19 PROCEDURE — 1160F RVW MEDS BY RX/DR IN RCRD: CPT | Performed by: INTERNAL MEDICINE

## 2023-12-19 PROCEDURE — 1126F AMNT PAIN NOTED NONE PRSNT: CPT | Performed by: INTERNAL MEDICINE

## 2023-12-19 PROCEDURE — 1159F MED LIST DOCD IN RCRD: CPT | Performed by: INTERNAL MEDICINE

## 2023-12-19 PROCEDURE — 99214 OFFICE O/P EST MOD 30 MIN: CPT | Performed by: INTERNAL MEDICINE

## 2023-12-19 PROCEDURE — 3008F BODY MASS INDEX DOCD: CPT | Performed by: INTERNAL MEDICINE

## 2023-12-19 PROCEDURE — 1036F TOBACCO NON-USER: CPT | Performed by: INTERNAL MEDICINE

## 2023-12-19 RX ORDER — DULOXETIN HYDROCHLORIDE 60 MG/1
CAPSULE, DELAYED RELEASE ORAL
Qty: 90 CAPSULE | Refills: 0 | Status: SHIPPED | OUTPATIENT
Start: 2023-12-19 | End: 2024-02-08 | Stop reason: SDUPTHER

## 2023-12-19 ASSESSMENT — ENCOUNTER SYMPTOMS: DEPRESSION: 1
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
## 2024-01-18 ENCOUNTER — OFFICE VISIT (OUTPATIENT)
Dept: PRIMARY CARE | Facility: CLINIC | Age: 74
End: 2024-01-18
Payer: MEDICARE

## 2024-01-18 VITALS
OXYGEN SATURATION: 98 % | BODY MASS INDEX: 23.19 KG/M2 | WEIGHT: 153 LBS | HEART RATE: 71 BPM | DIASTOLIC BLOOD PRESSURE: 84 MMHG | SYSTOLIC BLOOD PRESSURE: 128 MMHG

## 2024-01-18 DIAGNOSIS — Z86.39 H/O THYROID NODULE: ICD-10-CM

## 2024-01-18 DIAGNOSIS — I77.3 FIBROMUSCULAR DYSPLASIA (CMS-HCC): ICD-10-CM

## 2024-01-18 DIAGNOSIS — F43.10 PTSD (POST-TRAUMATIC STRESS DISORDER): ICD-10-CM
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
## 2024-01-18 DIAGNOSIS — M06.9 RHEUMATOID ARTHRITIS INVOLVING MULTIPLE SITES, UNSPECIFIED WHETHER RHEUMATOID FACTOR PRESENT (MULTI): ICD-10-CM

## 2024-01-18 DIAGNOSIS — S69.92XA INJURY OF LEFT HAND, INITIAL ENCOUNTER: Primary | ICD-10-CM

## 2024-01-18 PROCEDURE — 1159F MED LIST DOCD IN RCRD: CPT | Performed by: INTERNAL MEDICINE

## 2024-01-18 PROCEDURE — 99214 OFFICE O/P EST MOD 30 MIN: CPT | Performed by: INTERNAL MEDICINE

## 2024-01-18 PROCEDURE — 1036F TOBACCO NON-USER: CPT | Performed by: INTERNAL MEDICINE

## 2024-01-18 PROCEDURE — 1160F RVW MEDS BY RX/DR IN RCRD: CPT | Performed by: INTERNAL MEDICINE

## 2024-01-18 PROCEDURE — 3008F BODY MASS INDEX DOCD: CPT | Performed by: INTERNAL MEDICINE

## 2024-01-18 PROCEDURE — 1126F AMNT PAIN NOTED NONE PRSNT: CPT | Performed by: INTERNAL MEDICINE

## 2024-01-18 ASSESSMENT — ENCOUNTER SYMPTOMS
DEPRESSION: 0
LOSS OF SENSATION IN FEET: 0
BACK PAIN: 1
OCCASIONAL FEELINGS OF UNSTEADINESS: 0

## 2024-01-18 NOTE — PATIENT INSTRUCTIONS
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
## 2024-01-23 ENCOUNTER — OFFICE VISIT (OUTPATIENT)
Dept: ORTHOPEDIC SURGERY | Facility: CLINIC | Age: 74
End: 2024-01-23
Payer: MEDICARE

## 2024-01-23 VITALS — HEIGHT: 68 IN | BODY MASS INDEX: 21.98 KG/M2 | WEIGHT: 145 LBS

## 2024-01-23 DIAGNOSIS — S63.693A OTHER SPRAIN OF LEFT MIDDLE FINGER, INITIAL ENCOUNTER: Primary | ICD-10-CM
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
## 2024-01-23 PROCEDURE — 1160F RVW MEDS BY RX/DR IN RCRD: CPT

## 2024-01-23 PROCEDURE — 1126F AMNT PAIN NOTED NONE PRSNT: CPT

## 2024-01-23 PROCEDURE — 1159F MED LIST DOCD IN RCRD: CPT

## 2024-01-23 PROCEDURE — 99213 OFFICE O/P EST LOW 20 MIN: CPT

## 2024-01-23 PROCEDURE — 1036F TOBACCO NON-USER: CPT

## 2024-01-23 PROCEDURE — 3008F BODY MASS INDEX DOCD: CPT

## 2024-01-23 NOTE — PROGRESS NOTES
HPI  Marsha Duncan is a 73 y.o. female  in office today for   Chief Complaint   Patient presents with    Left Hand - Pain, Edema   .  she states that 6 days ago she missed a step and tumbled down hitting her head and left hand.  Went to ED at time, advised no fractures.  Arrived without any brace or splint.  Pain and swelling has improved over the last 6 days.  Residual pain middle and ring fingers of the left hand, middle more than ring.  She has been resting and icing the hand.      Medication  Current Outpatient Medications on File Prior to Visit   Medication Sig Dispense Refill    acetaminophen (Tylenol) 500 mg tablet Take by mouth every 8 hours.      aspirin 81 mg EC tablet Take by mouth.      DULoxetine (Cymbalta) 60 mg DR capsule Take 1 a day 90 capsule 0    multivitamin capsule Take 1 capsule by mouth once daily.      vitamins A,C,E-zinc-copper (PreserVision AREDS) 2,148 mcg-113 mg-45 mg-17.4mg tablet Take by mouth.       No current facility-administered medications on file prior to visit.       Physical Exam  Constitutional: well developed, well nourished female in no acute distress  Psychiatric: normal mood, appropriate affect  Eyes: sclera anicteric  HENT: normocephalic/atraumatic  CV: regular rate and rhythm   Respiratory: non labored breathing  Integumentary: no rash  Neurological: moves all extremities    Left Hand Exam     Tenderness   Left hand tenderness location: Base of middle finger, more radial side.     Range of Motion   Hand   MP Middle: normal   MP Ring: normal   PIP Middle: 80   PIP Rin   DIP Middle: 80   DIP Rin     Other   Erythema: absent  Scars: absent  Sensation: normal    Comments:  Mild edema about middle finger, clearing ecchymosis.  Some laxity at RCL with ulnar deviation          Imaging/Lab:  X-rays were taken at outside facility on  24 which I am able to view the report, but can not see the actual images.  From the outside read, it shows no fracture or dislocation.   Mild first CMC joint narrowing with small osteophytes.      Assessment  Assessment: Sprain of RCL of left middle finger, history of fall down stairs    Plan  Plan:  History, physical exam, and imaging were reviewed with patient. Patient to continue with RICE and pain medication as needed for her fingers.  Buddy taped the middle finger to the index for support while it continues to heal.  She should continue to be flexing and extending the hand.  Follow Up: Patient to follow up as needed if pain persists or gets worse.      All questions were answered for the patient prior to end of exam and patient addressed their understanding.    Aurelia Hernandez PA-C  01/23/24

## 2024-01-26 DIAGNOSIS — I65.23 BILATERAL CAROTID ARTERY STENOSIS: ICD-10-CM

## 2024-01-30 ENCOUNTER — APPOINTMENT (OUTPATIENT)
Dept: AUDIOLOGY | Facility: CLINIC | Age: 74
End: 2024-01-30

## 2024-01-31 ENCOUNTER — DOCUMENTATION (OUTPATIENT)
Dept: AUDIOLOGY | Facility: CLINIC | Age: 74
End: 2024-01-31
Payer: MEDICARE

## 2024-01-31 NOTE — PROGRESS NOTES
RIGHT: PHONAK AUDEO M 50 R 0 LENGTH S  [id number] Medium open dome  LEFT: PHONAK AUDEO M 50 R 0 LENGTH S  [id number] Medium open dome   FIT DATE: 8/2020  WARRANTY:11/20/2023  TODAY: 8/15/23     Patient brought in left hearing aid as she was missing the dome and needed assistance putting on new dome. Wax guard on left aid was changed and dome was replaced.

## 2024-02-05 ENCOUNTER — OFFICE VISIT (OUTPATIENT)
Dept: CARDIOLOGY | Facility: HOSPITAL | Age: 74
End: 2024-02-05
Payer: MEDICARE

## 2024-02-05 ENCOUNTER — HOSPITAL ENCOUNTER (OUTPATIENT)
Dept: VASCULAR MEDICINE | Facility: HOSPITAL | Age: 74
Discharge: HOME | End: 2024-02-05
Payer: MEDICARE

## 2024-02-05 VITALS
BODY MASS INDEX: 22.73 KG/M2 | DIASTOLIC BLOOD PRESSURE: 84 MMHG | SYSTOLIC BLOOD PRESSURE: 138 MMHG | HEIGHT: 68 IN | OXYGEN SATURATION: 99 % | HEART RATE: 66 BPM | WEIGHT: 150 LBS

## 2024-02-05 DIAGNOSIS — I70.0 AORTIC ATHEROSCLEROSIS (CMS-HCC): ICD-10-CM

## 2024-02-05 DIAGNOSIS — E04.1 THYROID NODULE: ICD-10-CM

## 2024-02-05 DIAGNOSIS — I65.23 BILATERAL CAROTID ARTERY STENOSIS: ICD-10-CM

## 2024-02-05 DIAGNOSIS — I77.3 FIBROMUSCULAR DYSPLASIA (CMS-HCC): ICD-10-CM

## 2024-02-05 PROCEDURE — 3008F BODY MASS INDEX DOCD: CPT | Performed by: INTERNAL MEDICINE

## 2024-02-05 PROCEDURE — 99214 OFFICE O/P EST MOD 30 MIN: CPT | Performed by: INTERNAL MEDICINE

## 2024-02-05 PROCEDURE — 93880 EXTRACRANIAL BILAT STUDY: CPT

## 2024-02-05 PROCEDURE — 93880 EXTRACRANIAL BILAT STUDY: CPT | Performed by: INTERNAL MEDICINE

## 2024-02-05 PROCEDURE — 1159F MED LIST DOCD IN RCRD: CPT | Performed by: INTERNAL MEDICINE

## 2024-02-05 PROCEDURE — 1160F RVW MEDS BY RX/DR IN RCRD: CPT | Performed by: INTERNAL MEDICINE

## 2024-02-05 PROCEDURE — 1126F AMNT PAIN NOTED NONE PRSNT: CPT | Performed by: INTERNAL MEDICINE

## 2024-02-05 PROCEDURE — 1036F TOBACCO NON-USER: CPT | Performed by: INTERNAL MEDICINE

## 2024-02-05 NOTE — PATIENT INSTRUCTIONS
Good to see you today Ms. Duncan.  Continue medications as prescribed.  Ordered thyroid ultrasound to look at nodule.  We will reach out with results.  Try plant stanols/sterols to lower cholesterol.  It is found in [employer] products, minute maid heart wise orange juice.    See you back in 1 year carotid ultrasound.
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
## 2024-02-08 ENCOUNTER — OFFICE VISIT (OUTPATIENT)
Dept: PRIMARY CARE | Facility: CLINIC | Age: 74
End: 2024-02-08
Payer: MEDICARE

## 2024-02-08 VITALS
OXYGEN SATURATION: 97 % | SYSTOLIC BLOOD PRESSURE: 110 MMHG | BODY MASS INDEX: 23.26 KG/M2 | WEIGHT: 153 LBS | HEART RATE: 78 BPM | DIASTOLIC BLOOD PRESSURE: 78 MMHG

## 2024-02-08 DIAGNOSIS — K21.9 GASTROESOPHAGEAL REFLUX DISEASE WITHOUT ESOPHAGITIS: ICD-10-CM

## 2024-02-08 DIAGNOSIS — Z86.39 H/O THYROID NODULE: Primary | ICD-10-CM

## 2024-02-08 DIAGNOSIS — F43.10 PTSD (POST-TRAUMATIC STRESS DISORDER): ICD-10-CM

## 2024-02-08 DIAGNOSIS — I77.3 FIBROMUSCULAR DYSPLASIA (CMS-HCC): ICD-10-CM

## 2024-02-08 PROBLEM — S69.92XA INJURY OF LEFT HAND: Status: ACTIVE | Noted: 2024-02-08

## 2024-02-08 PROBLEM — S63.613A SPRAIN OF LEFT MIDDLE FINGER: Status: ACTIVE | Noted: 2024-02-08

## 2024-02-08 PROBLEM — I83.90 VARICOSE VEINS OF LOWER EXTREMITY: Status: ACTIVE | Noted: 2023-08-03

## 2024-02-08 PROBLEM — Z20.822 CONTACT WITH AND (SUSPECTED) EXPOSURE TO COVID-19: Status: ACTIVE | Noted: 2023-03-14

## 2024-02-08 PROCEDURE — 99214 OFFICE O/P EST MOD 30 MIN: CPT | Performed by: INTERNAL MEDICINE

## 2024-02-08 PROCEDURE — 1160F RVW MEDS BY RX/DR IN RCRD: CPT | Performed by: INTERNAL MEDICINE

## 2024-02-08 PROCEDURE — 3008F BODY MASS INDEX DOCD: CPT | Performed by: INTERNAL MEDICINE

## 2024-02-08 PROCEDURE — 1159F MED LIST DOCD IN RCRD: CPT | Performed by: INTERNAL MEDICINE

## 2024-02-08 PROCEDURE — 1126F AMNT PAIN NOTED NONE PRSNT: CPT | Performed by: INTERNAL MEDICINE

## 2024-02-08 PROCEDURE — 1036F TOBACCO NON-USER: CPT | Performed by: INTERNAL MEDICINE

## 2024-02-08 RX ORDER — DULOXETIN HYDROCHLORIDE 60 MG/1
CAPSULE, DELAYED RELEASE ORAL
Qty: 90 CAPSULE | Refills: 0 | Status: SHIPPED | OUTPATIENT
Start: 2024-02-08 | End: 2024-05-09 | Stop reason: SDUPTHER
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
## 2024-02-09 ENCOUNTER — HOSPITAL ENCOUNTER (OUTPATIENT)
Dept: RADIOLOGY | Facility: CLINIC | Age: 74
Discharge: HOME | End: 2024-02-09
Payer: MEDICARE

## 2024-02-09 DIAGNOSIS — E04.1 THYROID NODULE: ICD-10-CM

## 2024-02-09 PROCEDURE — 76536 US EXAM OF HEAD AND NECK: CPT | Performed by: RADIOLOGY

## 2024-02-09 PROCEDURE — 76536 US EXAM OF HEAD AND NECK: CPT

## 2024-02-12 ENCOUNTER — APPOINTMENT (OUTPATIENT)
Dept: RADIOLOGY | Facility: HOSPITAL | Age: 74
End: 2024-02-12
Payer: MEDICARE

## 2024-02-13 ENCOUNTER — PATIENT MESSAGE (OUTPATIENT)
Dept: CARDIOLOGY | Facility: HOSPITAL | Age: 74
End: 2024-02-13
Payer: MEDICARE

## 2024-02-19 ENCOUNTER — HOSPITAL ENCOUNTER (OUTPATIENT)
Dept: RADIOLOGY | Facility: HOSPITAL | Age: 74
Discharge: HOME | End: 2024-02-19
Payer: MEDICARE

## 2024-02-19 ENCOUNTER — OFFICE VISIT (OUTPATIENT)
Dept: ORTHOPEDIC SURGERY | Facility: CLINIC | Age: 74
End: 2024-02-19
Payer: MEDICARE

## 2024-02-19 DIAGNOSIS — M79.642 HAND PAIN, LEFT: ICD-10-CM

## 2024-02-19 PROCEDURE — 3008F BODY MASS INDEX DOCD: CPT | Performed by: ORTHOPAEDIC SURGERY

## 2024-02-19 PROCEDURE — 99213 OFFICE O/P EST LOW 20 MIN: CPT | Performed by: ORTHOPAEDIC SURGERY

## 2024-02-19 PROCEDURE — 1159F MED LIST DOCD IN RCRD: CPT | Performed by: ORTHOPAEDIC SURGERY

## 2024-02-19 PROCEDURE — 73130 X-RAY EXAM OF HAND: CPT | Mod: LEFT SIDE | Performed by: RADIOLOGY

## 2024-02-19 PROCEDURE — 73130 X-RAY EXAM OF HAND: CPT | Mod: LT

## 2024-02-19 PROCEDURE — 1160F RVW MEDS BY RX/DR IN RCRD: CPT | Performed by: ORTHOPAEDIC SURGERY

## 2024-02-19 PROCEDURE — 1036F TOBACCO NON-USER: CPT | Performed by: ORTHOPAEDIC SURGERY

## 2024-02-19 PROCEDURE — 1126F AMNT PAIN NOTED NONE PRSNT: CPT | Performed by: ORTHOPAEDIC SURGERY

## 2024-02-21 NOTE — PROGRESS NOTES
History of Present Illness:  Chief Complaint   Patient presents with    Left Hand - Edema, Pain       Patient presents today for evaluation of left hand pain, particularly to the ring and middle fingers.  About 1 month ago she missed a step and caught her hand.  She had subsequent swelling between the middle and ring fingers and has continued to have some aching pains that are worse with pinching and gripping.  No numbness or tingling.  No previous issues with her middle/ring fingers.  No locking or catching.  She was seen shortly after injury and usman taping was recommended.  She did do this for a brief amount of time, but she has remained symptomatic.      Past Medical History:   Diagnosis Date    Accidental fall 03/27/2023    Ankle injury 03/27/2023    Aortic atherosclerosis (CMS/HCC)     Cough 03/27/2023    Fibromuscular dysplasia (CMS/HCC)     Laceration of forearm 03/27/2023    Nontoxic single thyroid nodule 04/15/2019    Thyroid nodule    Personal history of other infectious and parasitic diseases 10/06/2013    History of pseudomembranous enterocolitis    Personal history of other specified conditions 06/22/2020    History of abdominal pain    Unspecified cataract     Cataracts, bilateral    UTI (urinary tract infection) 06/23/2023    Varicose veins of both lower extremities        Medication Documentation Review Audit       Reviewed by Diamante Tomas MD (Physician) on 02/08/24 at 0940      Medication Order Taking? Sig Documenting Provider Last Dose Status   acetaminophen (Tylenol) 500 mg tablet [id number] Yes Take by mouth every 8 hours. Historical Provider, MD Taking Active   aspirin 81 mg EC tablet [id number] Yes Take by mouth. Historical Provider, MD Taking Active   DULoxetine (Cymbalta) 60 mg DR capsule [id number] Yes Take 1 a day Diamante Tomas MD Taking Active   multivitamin capsule [id number] Yes Take 1 capsule by mouth once daily. Historical Provider, MD Taking Active   vitamins  A,C,E-zinc-copper (PreserVision AREDS) 2,148 mcg-113 mg-45 mg-17.4mg tablet [id number] Yes Take by mouth. Historical Provider, MD Taking Active                    Allergies   Allergen Reactions    Penicillins Rash and Unknown    Latex Itching, Rash and Other    Oxaprozin Diarrhea, Rash and Unknown    Adhesive Other    Adhesive Tape-Silicones Unknown    Penicillin Rash       Social History     Socioeconomic History    Marital status: Single     Spouse name: Not on file    Number of children: Not on file    Years of education: Not on file    Highest education level: Not on file   Occupational History    Not on file   Tobacco Use    Smoking status: Never     Passive exposure: Never    Smokeless tobacco: Never   Vaping Use    Vaping Use: Never used   Substance and Sexual Activity    Alcohol use: Never    Drug use: Never    Sexual activity: Not Currently   Other Topics Concern    Not on file   Social History Narrative    Not on file     Social Determinants of Health     Financial Resource Strain: Not on file   Food Insecurity: Not on file   Transportation Needs: Not on file   Physical Activity: Not on file   Stress: Not on file   Social Connections: Not on file   Intimate Partner Violence: Not on file   Housing Stability: Not on file       Past Surgical History:   Procedure Laterality Date    COLONOSCOPY  08/08/2022    Complete Colonoscopy    COLONOSCOPY  08/08/2022    Complete Colonoscopy    COLONOSCOPY  08/08/2022    Complete Colonoscopy    CT ABDOMEN PELVIS ANGIOGRAM W AND/OR WO IV CONTRAST  6/19/2023    CT ABDOMEN PELVIS ANGIOGRAM W AND/OR WO IV CONTRAST 6/19/2023 GEN CT    CT ANGIO NECK  3/16/2023    CT NECK ANGIO W AND WO IV CONTRAST [facility] CT    KIDNEY SURGERY  04/25/2014    Kidney Surgery    KNEE SURGERY  04/25/2014    Knee Surgery    MR HEAD ANGIO WO IV CONTRAST  10/26/2023    MR HEAD ANGIO WO IV CONTRAST 10/26/2023 [facility]    OOPHORECTOMY  04/25/2014    Oophorectomy        Review of Systems   GENERAL: Negative for  malaise, significant weight loss, fever  MUSCULOSKELETAL: see HPI  NEURO:  Negative     Physical Examination  Constitutional: Appears well-developed and well-nourished.  Head: Normocephalic and atraumatic.  Eyes: EOMI grossly  Cardiovascular: Intact distal pulses.   Respiratory: Effort normal. No respiratory distress.  Neurologic: Alert and oriented to person, place, and time.  Skin: Skin is warm and dry.  Hematologic / Lymphatic: No lymphedema, lymphangitis.  Psychiatric: normal mood and affect. Behavior is normal.   Musculoskeletal:  Left hand: Skin intact.  Mild edema between the middle and ring fingers near level of MCP joint.  Relative instability of the ring finger radial collateral ligament at the MCP joint with stress.  Remaining MCP joint collateral ligaments grossly stable on exam.  0 cm DPC.  2+ radial pulse.  Capillary refill less than 2 seconds.    Radiographs: Left hand radiographs ordered and available for my review/interpretation: No fracture/dislocation.  Joint spaces relatively maintained with mild thumb CMC degenerative changes.     Assessment:  Patient with left ring finger radial collateral ligament injury at MCP joint     Plan:  Nature of the diagnosis was discussed with the patient.  We discussed risks and benefits of continued nonoperative versus operative treatment options.  Recommend beginning with usman taping of middle and ring fingers in order to provide stability to the ring finger radial collateral ligament.  We discussed that this should be done full-time for the next 6 weeks.  Plan on follow-up at that time for further analysis.  We did discuss potential for some residual instability and possible need for further intervention in the future.

## 2024-03-18 ENCOUNTER — OFFICE VISIT (OUTPATIENT)
Dept: ORTHOPEDIC SURGERY | Facility: CLINIC | Age: 74
End: 2024-03-18
Payer: MEDICARE

## 2024-03-18 DIAGNOSIS — S63.693A OTHER SPRAIN OF LEFT MIDDLE FINGER, INITIAL ENCOUNTER: Primary | ICD-10-CM

## 2024-03-18 PROCEDURE — 99213 OFFICE O/P EST LOW 20 MIN: CPT | Performed by: ORTHOPAEDIC SURGERY

## 2024-03-18 PROCEDURE — 1160F RVW MEDS BY RX/DR IN RCRD: CPT | Performed by: ORTHOPAEDIC SURGERY

## 2024-03-18 PROCEDURE — 1125F AMNT PAIN NOTED PAIN PRSNT: CPT | Performed by: ORTHOPAEDIC SURGERY

## 2024-03-18 PROCEDURE — 1159F MED LIST DOCD IN RCRD: CPT | Performed by: ORTHOPAEDIC SURGERY

## 2024-03-18 PROCEDURE — 3008F BODY MASS INDEX DOCD: CPT | Performed by: ORTHOPAEDIC SURGERY

## 2024-03-18 PROCEDURE — 1036F TOBACCO NON-USER: CPT | Performed by: ORTHOPAEDIC SURGERY

## 2024-03-18 ASSESSMENT — PAIN DESCRIPTION - DESCRIPTORS: DESCRIPTORS: ACHING

## 2024-03-18 ASSESSMENT — PAIN SCALES - GENERAL: PAINLEVEL_OUTOF10: 1

## 2024-03-18 ASSESSMENT — PAIN - FUNCTIONAL ASSESSMENT: PAIN_FUNCTIONAL_ASSESSMENT: 0-10

## 2024-03-19 NOTE — PROGRESS NOTES
History of Present Illness:  Chief Complaint   Patient presents with    Left Hand - Follow-up     left ring finger radial collateral ligament injury at MCP joint       Patient undergoing nonoperative treatment for left ring finger collateral ligament injury at MCP joint.  She has continued with full-time usman taping.  Minimal discomfort at this time.      Past Medical History:   Diagnosis Date    Accidental fall 03/27/2023    Ankle injury 03/27/2023    Aortic atherosclerosis (CMS/HCC)     Cough 03/27/2023    Fibromuscular dysplasia (CMS/HCC)     Laceration of forearm 03/27/2023    Nontoxic single thyroid nodule 04/15/2019    Thyroid nodule    Personal history of other infectious and parasitic diseases 10/06/2013    History of pseudomembranous enterocolitis    Personal history of other specified conditions 06/22/2020    History of abdominal pain    Unspecified cataract     Cataracts, bilateral    UTI (urinary tract infection) 06/23/2023    Varicose veins of both lower extremities        Medication Documentation Review Audit       Reviewed by Keyla Hodges CMA (Medical Assistant) on 03/18/24 at 1105      Medication Order Taking? Sig Documenting Provider Last Dose Status   acetaminophen (Tylenol) 500 mg tablet [id number] No Take by mouth every 8 hours. Historical MD Jocelyn Taking Active   aspirin 81 mg EC tablet [id number] No Take by mouth. Historical Provider, MD Taking Active   DULoxetine (Cymbalta) 60 mg DR capsule [id number]  Take 1 a day Diamante Tomas MD  Active   multivitamin capsule [id number] No Take 1 capsule by mouth once daily. Historical Provider, MD Taking Active   vitamins A,C,E-zinc-copper (PreserVision AREDS) 2,148 mcg-113 mg-45 mg-17.4mg tablet [id number] No Take by mouth. Historical Provider, MD Taking Active                    Allergies   Allergen Reactions    Penicillins Rash and Unknown    Latex Itching, Rash and Other    Oxaprozin Diarrhea, Rash and Unknown    Adhesive Other    Adhesive  Tape-Silicones Unknown    Penicillin Rash       Social History     Socioeconomic History    Marital status: Single     Spouse name: Not on file    Number of children: Not on file    Years of education: Not on file    Highest education level: Not on file   Occupational History    Not on file   Tobacco Use    Smoking status: Never     Passive exposure: Never    Smokeless tobacco: Never   Vaping Use    Vaping Use: Never used   Substance and Sexual Activity    Alcohol use: Never    Drug use: Never    Sexual activity: Not Currently   Other Topics Concern    Not on file   Social History Narrative    Not on file     Social Determinants of Health     Financial Resource Strain: Not on file   Food Insecurity: Not on file   Transportation Needs: Not on file   Physical Activity: Not on file   Stress: Not on file   Social Connections: Not on file   Intimate Partner Violence: Not on file   Housing Stability: Not on file       Past Surgical History:   Procedure Laterality Date    COLONOSCOPY  08/08/2022    Complete Colonoscopy    COLONOSCOPY  08/08/2022    Complete Colonoscopy    COLONOSCOPY  08/08/2022    Complete Colonoscopy    CT ABDOMEN PELVIS ANGIOGRAM W AND/OR WO IV CONTRAST  6/19/2023    CT ABDOMEN PELVIS ANGIOGRAM W AND/OR WO IV CONTRAST 6/19/2023 GEN CT    CT ANGIO NECK  3/16/2023    CT NECK ANGIO W AND WO IV CONTRAST [facility] CT    KIDNEY SURGERY  04/25/2014    Kidney Surgery    KNEE SURGERY  04/25/2014    Knee Surgery    MR HEAD ANGIO WO IV CONTRAST  10/26/2023    MR HEAD ANGIO WO IV CONTRAST 10/26/2023 [facility]    OOPHORECTOMY  04/25/2014    Oophorectomy        Review of Systems   GENERAL: Negative for malaise, significant weight loss, fever  MUSCULOSKELETAL: see HPI  NEURO:  Negative     Physical Examination  Constitutional: Appears well-developed and well-nourished.  Head: Normocephalic and atraumatic.  Eyes: EOMI grossly  Cardiovascular: Intact distal pulses.   Respiratory: Effort normal. No respiratory  distress.  Neurologic: Alert and oriented to person, place, and time.  Skin: Skin is warm and dry.  Hematologic / Lymphatic: No lymphedema, lymphangitis.  Psychiatric: normal mood and affect. Behavior is normal.   Musculoskeletal:  Left hand: Skin intact.  Edema improving.  Middle and ring fingers grossly stable with good endpoint with testing of collateral ligaments with MCP joints in 90 degrees flexion.  0 cm DPC.  Able to fully extend all digits.  No scissoring/malrotation.      Assessment:  Patient with left ring finger radial collateral ligament injury at MCP joint.  Improved stability after usman taping.     Plan:  Patient may gradually transition out of usman taping as she feels comfortable.  We discussed expected recovery course and she will follow-up with me in the future if any persistent issues/concerns.

## 2024-05-09 ENCOUNTER — OFFICE VISIT (OUTPATIENT)
Dept: PRIMARY CARE | Facility: CLINIC | Age: 74
End: 2024-05-09
Payer: MEDICARE

## 2024-05-09 VITALS
WEIGHT: 142.8 LBS | HEART RATE: 76 BPM | OXYGEN SATURATION: 99 % | SYSTOLIC BLOOD PRESSURE: 122 MMHG | DIASTOLIC BLOOD PRESSURE: 74 MMHG | BODY MASS INDEX: 21.71 KG/M2 | TEMPERATURE: 98.2 F

## 2024-05-09 DIAGNOSIS — E04.1 THYROID NODULE: Primary | ICD-10-CM

## 2024-05-09 DIAGNOSIS — F43.10 PTSD (POST-TRAUMATIC STRESS DISORDER): ICD-10-CM

## 2024-05-09 DIAGNOSIS — K21.9 GASTROESOPHAGEAL REFLUX DISEASE WITHOUT ESOPHAGITIS: ICD-10-CM

## 2024-05-09 DIAGNOSIS — I77.3 FIBROMUSCULAR DYSPLASIA (CMS-HCC): ICD-10-CM

## 2024-05-09 PROCEDURE — 1160F RVW MEDS BY RX/DR IN RCRD: CPT | Performed by: INTERNAL MEDICINE

## 2024-05-09 PROCEDURE — 3008F BODY MASS INDEX DOCD: CPT | Performed by: INTERNAL MEDICINE

## 2024-05-09 PROCEDURE — 99214 OFFICE O/P EST MOD 30 MIN: CPT | Performed by: INTERNAL MEDICINE

## 2024-05-09 PROCEDURE — 1036F TOBACCO NON-USER: CPT | Performed by: INTERNAL MEDICINE

## 2024-05-09 PROCEDURE — 1159F MED LIST DOCD IN RCRD: CPT | Performed by: INTERNAL MEDICINE

## 2024-05-09 RX ORDER — DULOXETIN HYDROCHLORIDE 60 MG/1
CAPSULE, DELAYED RELEASE ORAL
Qty: 90 CAPSULE | Refills: 0 | Status: SHIPPED | OUTPATIENT
Start: 2024-05-09
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
## 2024-08-13 ENCOUNTER — OFFICE VISIT (OUTPATIENT)
Dept: PRIMARY CARE | Facility: CLINIC | Age: 74
End: 2024-08-13
Payer: MEDICARE

## 2024-08-13 ENCOUNTER — APPOINTMENT (OUTPATIENT)
Dept: AUDIOLOGY | Facility: CLINIC | Age: 74
End: 2024-08-13
Payer: MEDICARE

## 2024-08-13 VITALS
SYSTOLIC BLOOD PRESSURE: 120 MMHG | OXYGEN SATURATION: 98 % | HEART RATE: 98 BPM | BODY MASS INDEX: 21.1 KG/M2 | DIASTOLIC BLOOD PRESSURE: 76 MMHG | WEIGHT: 138.8 LBS | TEMPERATURE: 98.2 F

## 2024-08-13 DIAGNOSIS — Z00.00 ENCOUNTER FOR SUBSEQUENT ANNUAL WELLNESS VISIT (AWV) IN MEDICARE PATIENT: Primary | ICD-10-CM

## 2024-08-13 DIAGNOSIS — Z12.31 ENCOUNTER FOR SCREENING MAMMOGRAM FOR MALIGNANT NEOPLASM OF BREAST: ICD-10-CM

## 2024-08-13 DIAGNOSIS — H90.3 SENSORINEURAL HEARING LOSS (SNHL) OF BOTH EARS: Primary | ICD-10-CM

## 2024-08-13 DIAGNOSIS — I77.3 FIBROMUSCULAR DYSPLASIA (CMS-HCC): ICD-10-CM

## 2024-08-13 DIAGNOSIS — E04.1 THYROID NODULE: ICD-10-CM

## 2024-08-13 DIAGNOSIS — E78.00 HYPERCHOLESTEREMIA: ICD-10-CM

## 2024-08-13 DIAGNOSIS — F43.10 PTSD (POST-TRAUMATIC STRESS DISORDER): ICD-10-CM

## 2024-08-13 DIAGNOSIS — E55.9 VITAMIN D DEFICIENCY: ICD-10-CM

## 2024-08-13 DIAGNOSIS — R53.83 OTHER FATIGUE: ICD-10-CM

## 2024-08-13 DIAGNOSIS — K21.9 GASTROESOPHAGEAL REFLUX DISEASE WITHOUT ESOPHAGITIS: ICD-10-CM

## 2024-08-13 PROCEDURE — 1036F TOBACCO NON-USER: CPT | Performed by: INTERNAL MEDICINE

## 2024-08-13 PROCEDURE — 1159F MED LIST DOCD IN RCRD: CPT | Performed by: INTERNAL MEDICINE

## 2024-08-13 PROCEDURE — 1170F FXNL STATUS ASSESSED: CPT | Performed by: INTERNAL MEDICINE

## 2024-08-13 PROCEDURE — 99214 OFFICE O/P EST MOD 30 MIN: CPT | Performed by: INTERNAL MEDICINE

## 2024-08-13 PROCEDURE — G0439 PPPS, SUBSEQ VISIT: HCPCS | Performed by: INTERNAL MEDICINE

## 2024-08-13 PROCEDURE — HRANC PR HEARING AID NO CHARGE: Performed by: AUDIOLOGIST

## 2024-08-13 PROCEDURE — 1160F RVW MEDS BY RX/DR IN RCRD: CPT | Performed by: INTERNAL MEDICINE

## 2024-08-13 PROCEDURE — 1124F ACP DISCUSS-NO DSCNMKR DOCD: CPT | Performed by: INTERNAL MEDICINE

## 2024-08-13 ASSESSMENT — ACTIVITIES OF DAILY LIVING (ADL)
BATHING: INDEPENDENT
MANAGING_FINANCES: INDEPENDENT
DOING_HOUSEWORK: INDEPENDENT
DRESSING: INDEPENDENT
TAKING_MEDICATION: INDEPENDENT
GROCERY_SHOPPING: INDEPENDENT

## 2024-08-13 ASSESSMENT — ENCOUNTER SYMPTOMS
LOSS OF SENSATION IN FEET: 0
OCCASIONAL FEELINGS OF UNSTEADINESS: 0
DEPRESSION: 0

## 2024-08-13 ASSESSMENT — PATIENT HEALTH QUESTIONNAIRE - PHQ9
2. FEELING DOWN, DEPRESSED OR HOPELESS: NOT AT ALL
SUM OF ALL RESPONSES TO PHQ9 QUESTIONS 1 AND 2: 0
1. LITTLE INTEREST OR PLEASURE IN DOING THINGS: NOT AT ALL

## 2024-08-13 NOTE — PROGRESS NOTES
HEARING AID CHECK    :RIGHT: PHONAK AUDEO M 50 R 0 LENGTH S  [id number]  LEFT: PHONAK AUDEO M 50 R 0 LENGTH S  [id number]  FIT DATE: 8/2020  WARRANTY:11/20/2023  TODAY: 8/13/24     This patient was seen for a H A CHECK: Right aid check and lost left one month ago. Discussed getting new aids instead of replacement of new one which will not be compatible with Audeo M.  Gave pricing for new aids.   Otoscopy showed clear canals however  was caked with wax.   Reminded that need a new hearing test if we get new aids.   Cleaned one aid only and mostly discussed newer technology and demo'd aids in [place].         The following programming changes were made: none  a clean and check only on one aid.     The patient paid No Charge   for today's visit.  Return in 6 months or sooner if needed.    APPOINTMENT TIME:  30 minutes.
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
## 2024-08-15 ENCOUNTER — APPOINTMENT (OUTPATIENT)
Dept: PRIMARY CARE | Facility: CLINIC | Age: 74
End: 2024-08-15
Payer: MEDICARE

## 2024-08-19 ENCOUNTER — LAB (OUTPATIENT)
Dept: LAB | Facility: LAB | Age: 74
End: 2024-08-19
Payer: MEDICARE

## 2024-08-19 DIAGNOSIS — E78.00 HYPERCHOLESTEREMIA: ICD-10-CM

## 2024-08-19 DIAGNOSIS — E55.9 VITAMIN D DEFICIENCY: ICD-10-CM

## 2024-08-19 DIAGNOSIS — R53.83 OTHER FATIGUE: ICD-10-CM

## 2024-08-19 LAB
BASOPHILS # BLD AUTO: 0.03 X10*3/UL (ref 0–0.1)
BASOPHILS NFR BLD AUTO: 0.5 %
EOSINOPHIL # BLD AUTO: 0.07 X10*3/UL (ref 0–0.4)
EOSINOPHIL NFR BLD AUTO: 1.2 %
ERYTHROCYTE [DISTWIDTH] IN BLOOD BY AUTOMATED COUNT: 12.3 % (ref 11.5–14.5)
HCT VFR BLD AUTO: 37.3 % (ref 36–46)
HGB BLD-MCNC: 12.2 G/DL (ref 12–16)
IMM GRANULOCYTES # BLD AUTO: 0.06 X10*3/UL (ref 0–0.5)
IMM GRANULOCYTES NFR BLD AUTO: 1.1 % (ref 0–0.9)
LYMPHOCYTES # BLD AUTO: 1.56 X10*3/UL (ref 0.8–3)
LYMPHOCYTES NFR BLD AUTO: 27.8 %
MCH RBC QN AUTO: 29.5 PG (ref 26–34)
MCHC RBC AUTO-ENTMCNC: 32.7 G/DL (ref 32–36)
MCV RBC AUTO: 90 FL (ref 80–100)
MONOCYTES # BLD AUTO: 0.39 X10*3/UL (ref 0.05–0.8)
MONOCYTES NFR BLD AUTO: 7 %
NEUTROPHILS # BLD AUTO: 3.5 X10*3/UL (ref 1.6–5.5)
NEUTROPHILS NFR BLD AUTO: 62.4 %
NRBC BLD-RTO: 0 /100 WBCS (ref 0–0)
PLATELET # BLD AUTO: 166 X10*3/UL (ref 150–450)
RBC # BLD AUTO: 4.14 X10*6/UL (ref 4–5.2)
WBC # BLD AUTO: 5.6 X10*3/UL (ref 4.4–11.3)

## 2024-08-19 PROCEDURE — 80061 LIPID PANEL: CPT

## 2024-08-19 PROCEDURE — 80053 COMPREHEN METABOLIC PANEL: CPT

## 2024-08-19 PROCEDURE — 85025 COMPLETE CBC W/AUTO DIFF WBC: CPT

## 2024-08-19 PROCEDURE — 84443 ASSAY THYROID STIM HORMONE: CPT

## 2024-08-19 PROCEDURE — 36415 COLL VENOUS BLD VENIPUNCTURE: CPT

## 2024-08-19 PROCEDURE — 82306 VITAMIN D 25 HYDROXY: CPT

## 2024-08-20 LAB
25(OH)D3 SERPL-MCNC: 36 NG/ML (ref 30–100)
ALBUMIN SERPL BCP-MCNC: 4 G/DL (ref 3.4–5)
ALP SERPL-CCNC: 80 U/L (ref 33–136)
ALT SERPL W P-5'-P-CCNC: 10 U/L (ref 7–45)
ANION GAP SERPL CALC-SCNC: 13 MMOL/L (ref 10–20)
AST SERPL W P-5'-P-CCNC: 20 U/L (ref 9–39)
BILIRUB SERPL-MCNC: 0.6 MG/DL (ref 0–1.2)
BUN SERPL-MCNC: 22 MG/DL (ref 6–23)
CALCIUM SERPL-MCNC: 8.8 MG/DL (ref 8.6–10.3)
CHLORIDE SERPL-SCNC: 102 MMOL/L (ref 98–107)
CHOLEST SERPL-MCNC: 177 MG/DL (ref 0–199)
CHOLESTEROL/HDL RATIO: 2.8
CO2 SERPL-SCNC: 29 MMOL/L (ref 21–32)
CREAT SERPL-MCNC: 0.81 MG/DL (ref 0.5–1.05)
EGFRCR SERPLBLD CKD-EPI 2021: 77 ML/MIN/1.73M*2
GLUCOSE SERPL-MCNC: 75 MG/DL (ref 74–99)
HDLC SERPL-MCNC: 62.3 MG/DL
LDLC SERPL CALC-MCNC: 104 MG/DL
NON HDL CHOLESTEROL: 115 MG/DL (ref 0–149)
POTASSIUM SERPL-SCNC: 4.2 MMOL/L (ref 3.5–5.3)
PROT SERPL-MCNC: 6.4 G/DL (ref 6.4–8.2)
SODIUM SERPL-SCNC: 140 MMOL/L (ref 136–145)
TRIGL SERPL-MCNC: 56 MG/DL (ref 0–149)
TSH SERPL-ACNC: 0.55 MIU/L (ref 0.44–3.98)
VLDL: 11 MG/DL (ref 0–40)

## 2024-09-14 DIAGNOSIS — F43.10 PTSD (POST-TRAUMATIC STRESS DISORDER): ICD-10-CM

## 2024-09-16 RX ORDER — DULOXETIN HYDROCHLORIDE 60 MG/1
CAPSULE, DELAYED RELEASE ORAL
Qty: 90 CAPSULE | Refills: 0 | Status: SHIPPED | OUTPATIENT
Start: 2024-09-16

## 2024-09-25 ENCOUNTER — APPOINTMENT (OUTPATIENT)
Dept: ENDOCRINOLOGY | Facility: CLINIC | Age: 74
End: 2024-09-25
Payer: MEDICARE

## 2024-09-25 VITALS
WEIGHT: 139 LBS | DIASTOLIC BLOOD PRESSURE: 77 MMHG | BODY MASS INDEX: 21.13 KG/M2 | SYSTOLIC BLOOD PRESSURE: 119 MMHG | HEART RATE: 66 BPM

## 2024-09-25 DIAGNOSIS — E04.2 NONTOXIC MULTINODULAR GOITER: ICD-10-CM

## 2024-09-25 PROCEDURE — 1160F RVW MEDS BY RX/DR IN RCRD: CPT | Performed by: INTERNAL MEDICINE

## 2024-09-25 PROCEDURE — 1036F TOBACCO NON-USER: CPT | Performed by: INTERNAL MEDICINE

## 2024-09-25 PROCEDURE — 99204 OFFICE O/P NEW MOD 45 MIN: CPT | Performed by: INTERNAL MEDICINE

## 2024-09-25 PROCEDURE — 1159F MED LIST DOCD IN RCRD: CPT | Performed by: INTERNAL MEDICINE

## 2024-09-25 ASSESSMENT — ENCOUNTER SYMPTOMS
WEAKNESS: 0
COUGH: 1
TREMORS: 0
CONSTIPATION: 0
FATIGUE: 0
PALPITATIONS: 0
SHORTNESS OF BREATH: 0
VOMITING: 0
ABDOMINAL PAIN: 0
FEVER: 0
TROUBLE SWALLOWING: 0
HEADACHES: 0
NERVOUS/ANXIOUS: 0
DIARRHEA: 0
UNEXPECTED WEIGHT CHANGE: 0
NECK PAIN: 0
NAUSEA: 0

## 2024-09-25 NOTE — LETTER
September 25, 2024     Diamante Tomas MD  [address] [address]    Patient: Marsha Duncan   YOB: 1950   Date of Visit: 9/25/2024       Dear Dr. Diamante Tomas MD:    Thank you for referring Marsha Duncan to me for evaluation. Below are my notes for this consultation.  If you have questions, please do not hesitate to call me. I look forward to following your patient along with you.       Sincerely,     Des Tong MD      CC: No Recipients  ______________________________________________________________________________________    History Of Present Illness  Marsha Duncan is a 74 y.o. female with multinodular goiter    Former patient of Prema Pierson    Goiter first noted 25 years ago  History of FNAB done by Dr. Lloyd     No history of thyroid dysfunction.           No personal history of radiation exposure.  No personal history of neck surgery.    No family history of thyroid cancer.  Positive family history of thyroid disease.   Maternal aunt had thyroidectomy.     Last ultrasound 8/5/21    Past Medical History  She has a past medical history of Accidental fall (03/27/2023), Ankle injury (03/27/2023), Aortic atherosclerosis (CMS-HCC), Cough (03/27/2023), Fibromuscular dysplasia (CMS-HCC), Laceration of forearm (03/27/2023), Nontoxic single thyroid nodule (04/15/2019), Personal history of other infectious and parasitic diseases (10/06/2013), Personal history of other specified conditions (06/22/2020), Unspecified cataract, UTI (urinary tract infection) (06/23/2023), and Varicose veins of both lower extremities.    Surgical History  She has a past surgical history that includes Kidney surgery (04/25/2014); Oophorectomy (04/25/2014); Knee surgery (04/25/2014); Colonoscopy (08/08/2022); Colonoscopy (08/08/2022); Colonoscopy (08/08/2022); CT angio neck (3/16/2023); CT angio abdomen pelvis w and or wo IV IV contrast (6/19/2023); and MR angio head wo IV contrast  (10/26/2023).     Social History  She reports that she has never smoked. She has never been exposed to tobacco smoke. She has never used smokeless tobacco. She reports that she does not drink alcohol and does not use drugs.    Family History  Family History   Problem Relation Name Age of Onset   • Other (cardiac disorder) Mother     • Colon cancer Mother     • Stroke Father         Medications  Current Outpatient Medications   Medication Instructions   • acetaminophen (Tylenol) 500 mg tablet oral, Every 8 hours   • aspirin 81 mg EC tablet oral   • DULoxetine (Cymbalta) 60 mg DR capsule TAKE 1 CAPSULE BY MOUTH EVERY DAY   • multivitamin capsule 1 capsule, oral, Daily   • vitamins A,C,E-zinc-copper (PreserVision AREDS) 2,148 mcg-113 mg-45 mg-17.4mg tablet oral       Allergies  Penicillins, Latex, Oxaprozin, Adhesive, Adhesive tape-silicones, and Penicillin    Review of Systems   Constitutional:  Negative for fatigue, fever and unexpected weight change.   HENT:  Positive for hearing loss. Negative for trouble swallowing.    Eyes:  Negative for visual disturbance.   Respiratory:  Positive for cough. Negative for shortness of breath.    Cardiovascular:  Negative for chest pain and palpitations.   Gastrointestinal:  Negative for abdominal pain, constipation, diarrhea, nausea and vomiting.   Endocrine: Negative for cold intolerance and heat intolerance.   Musculoskeletal:  Negative for neck pain.   Skin:  Negative for rash.   Neurological:  Negative for tremors, weakness and headaches.   Psychiatric/Behavioral:  The patient is not nervous/anxious.          Last Recorded Vitals  Blood pressure 119/77, pulse 66, weight 63 kg (139 lb).    Physical Exam  Constitutional:       General: She is not in acute distress.  HENT:      Head: Normocephalic.      Mouth/Throat:      Mouth: Mucous membranes are moist.   Eyes:      Comments: Medial deviation left eye   Neck:      Thyroid: No thyroid mass or thyromegaly.      Comments:  Thyroid small, multinodular, left lobe larger than right, mobile with swallow.   Cardiovascular:      Pulses:           Radial pulses are 2+ on the right side and 2+ on the left side.   Musculoskeletal:      Right lower leg: No edema.      Left lower leg: No edema.   Lymphadenopathy:      Cervical: No cervical adenopathy.   Neurological:      Mental Status: She is alert.      Motor: No tremor.   Psychiatric:         Mood and Affect: Affect normal.          Relevant Results  Lab Results   Component Value Date    TSH 0.55 08/19/2024     US THYROID (8/5/21)  FINDINGS:     Right lobe of the thyroid: 5.0 x 1.9 x 1.2 cm.  Right lobe of thyroid  demonstrates mild heterogeneous echotexture. Mixed cystic/solid nodule  demonstrated in the mid right lobe of thyroid posteriorly. This well  marginated nodule measures up to 1.0 x 0.9 x 1.5 cm. This gives a TI-RADS  score of 2. Also, there is a nodule in the mid right lobe identified  measuring up to 3 x 8 x 8 mm with isoechoic solid nodule. Also, there is a  isoechoic nodule within the right lobe inferiorly measuring up to 4 x 4 x 5  mm.     Left lobe of the thyroid: 5.02 x 1.6 x 1.5 cm. Left lobe of thyroid  demonstrates several nodules. There is a solid nodule within the mid left  lobe measuring up to 1.1 x 1.2 x 1.8 cm with isoechoic solid nodule  demonstrated with TI-RADS score of three. Given the size of nodule follow-up  is recommended at one, three, and five year interval. There is a predominant  cystic focus within the mid left lobe measuring up to 7 mm. A few other  cysts are demonstrated.     Thyroid isthmus: 0.1 cm. Unremarkable.     IMPRESSION:  1. Nodules within bilateral lobes of thyroid with largest nodule in the mid  left lobe demonstrating a TI-RADS score of 3. Follow-up ultrasound  recommended in one, three, and five year interval.      IMPRESSION  MULTINODULAR GOITER  Euthyroid  No compressive neck symptoms  Remote history of  FNAB      RECOMMENDATIONS  Ultrasound thyroid  Results available on [employer]  Call/message if you have not received results in 3 days.       Follow up to be determined.     If there are any suspicious nodules, I would recommend Ultrasound guided fine needle biopsy of the thyroid  Discussed procedure and risks, possible outcomes including benign, malignant, suspicious or nondiagnostic.

## 2024-09-25 NOTE — PATIENT INSTRUCTIONS
RECOMMENDATIONS  Ultrasound thyroid  Results available on [employer]  Call/message if you have not received results in 3 days.       Follow up to be determined.     If there are any suspicious nodules, I would recommend Ultrasound guided fine needle biopsy of the thyroid  Discussed procedure and risks, possible outcomes including benign, malignant, suspicious or nondiagnostic.

## 2024-09-25 NOTE — PROGRESS NOTES
History Of Present Illness  Marsha Duncan is a 74 y.o. female with multinodular goiter    Former patient of Prema Pierson    Goiter first noted 25 years ago  History of FNAB done by Dr. Lloyd     No history of thyroid dysfunction.           No personal history of radiation exposure.  No personal history of neck surgery.    No family history of thyroid cancer.  Positive family history of thyroid disease.   Maternal aunt had thyroidectomy.     Last ultrasound 8/5/21    Past Medical History  She has a past medical history of Accidental fall (03/27/2023), Ankle injury (03/27/2023), Aortic atherosclerosis (CMS-HCC), Cough (03/27/2023), Fibromuscular dysplasia (CMS-HCC), Laceration of forearm (03/27/2023), Nontoxic single thyroid nodule (04/15/2019), Personal history of other infectious and parasitic diseases (10/06/2013), Personal history of other specified conditions (06/22/2020), Unspecified cataract, UTI (urinary tract infection) (06/23/2023), and Varicose veins of both lower extremities.    Surgical History  She has a past surgical history that includes Kidney surgery (04/25/2014); Oophorectomy (04/25/2014); Knee surgery (04/25/2014); Colonoscopy (08/08/2022); Colonoscopy (08/08/2022); Colonoscopy (08/08/2022); CT angio neck (3/16/2023); CT angio abdomen pelvis w and or wo IV IV contrast (6/19/2023); and MR angio head wo IV contrast (10/26/2023).     Social History  She reports that she has never smoked. She has never been exposed to tobacco smoke. She has never used smokeless tobacco. She reports that she does not drink alcohol and does not use drugs.    Family History  Family History   Problem Relation Name Age of Onset    Other (cardiac disorder) Mother      Colon cancer Mother      Stroke Father         Medications  Current Outpatient Medications   Medication Instructions    acetaminophen (Tylenol) 500 mg tablet oral, Every 8 hours    aspirin 81 mg EC tablet oral    DULoxetine (Cymbalta) 60 mg DR capsule  TAKE 1 CAPSULE BY MOUTH EVERY DAY    multivitamin capsule 1 capsule, oral, Daily    vitamins A,C,E-zinc-copper (PreserVision AREDS) 2,148 mcg-113 mg-45 mg-17.4mg tablet oral       Allergies  Penicillins, Latex, Oxaprozin, Adhesive, Adhesive tape-silicones, and Penicillin    Review of Systems   Constitutional:  Negative for fatigue, fever and unexpected weight change.   HENT:  Positive for hearing loss. Negative for trouble swallowing.    Eyes:  Negative for visual disturbance.   Respiratory:  Positive for cough. Negative for shortness of breath.    Cardiovascular:  Negative for chest pain and palpitations.   Gastrointestinal:  Negative for abdominal pain, constipation, diarrhea, nausea and vomiting.   Endocrine: Negative for cold intolerance and heat intolerance.   Musculoskeletal:  Negative for neck pain.   Skin:  Negative for rash.   Neurological:  Negative for tremors, weakness and headaches.   Psychiatric/Behavioral:  The patient is not nervous/anxious.          Last Recorded Vitals  Blood pressure 119/77, pulse 66, weight 63 kg (139 lb).    Physical Exam  Constitutional:       General: She is not in acute distress.  HENT:      Head: Normocephalic.      Mouth/Throat:      Mouth: Mucous membranes are moist.   Eyes:      Comments: Medial deviation left eye   Neck:      Thyroid: No thyroid mass or thyromegaly.      Comments: Thyroid small, multinodular, left lobe larger than right, mobile with swallow.   Cardiovascular:      Pulses:           Radial pulses are 2+ on the right side and 2+ on the left side.   Musculoskeletal:      Right lower leg: No edema.      Left lower leg: No edema.   Lymphadenopathy:      Cervical: No cervical adenopathy.   Neurological:      Mental Status: She is alert.      Motor: No tremor.   Psychiatric:         Mood and Affect: Affect normal.          Relevant Results  Lab Results   Component Value Date    TSH 0.55 08/19/2024      THYROID (8/5/21)  FINDINGS:     Right lobe of the  thyroid: 5.0 x 1.9 x 1.2 cm.  Right lobe of thyroid  demonstrates mild heterogeneous echotexture. Mixed cystic/solid nodule  demonstrated in the mid right lobe of thyroid posteriorly. This well  marginated nodule measures up to 1.0 x 0.9 x 1.5 cm. This gives a TI-RADS  score of 2. Also, there is a nodule in the mid right lobe identified  measuring up to 3 x 8 x 8 mm with isoechoic solid nodule. Also, there is a  isoechoic nodule within the right lobe inferiorly measuring up to 4 x 4 x 5  mm.     Left lobe of the thyroid: 5.02 x 1.6 x 1.5 cm. Left lobe of thyroid  demonstrates several nodules. There is a solid nodule within the mid left  lobe measuring up to 1.1 x 1.2 x 1.8 cm with isoechoic solid nodule  demonstrated with TI-RADS score of three. Given the size of nodule follow-up  is recommended at one, three, and five year interval. There is a predominant  cystic focus within the mid left lobe measuring up to 7 mm. A few other  cysts are demonstrated.     Thyroid isthmus: 0.1 cm. Unremarkable.     IMPRESSION:  1. Nodules within bilateral lobes of thyroid with largest nodule in the mid  left lobe demonstrating a TI-RADS score of 3. Follow-up ultrasound  recommended in one, three, and five year interval.      IMPRESSION  MULTINODULAR GOITER  Euthyroid  No compressive neck symptoms  Remote history of FNAB      RECOMMENDATIONS  Ultrasound thyroid  Results available on [employer]  Call/message if you have not received results in 3 days.       Follow up to be determined.     If there are any suspicious nodules, I would recommend Ultrasound guided fine needle biopsy of the thyroid  Discussed procedure and risks, possible outcomes including benign, malignant, suspicious or nondiagnostic.      none

## 2024-09-27 ENCOUNTER — HOSPITAL ENCOUNTER (OUTPATIENT)
Dept: RADIOLOGY | Facility: CLINIC | Age: 74
Discharge: HOME | End: 2024-09-27
Payer: MEDICARE

## 2024-09-27 DIAGNOSIS — E04.2 NONTOXIC MULTINODULAR GOITER: ICD-10-CM

## 2024-09-27 PROCEDURE — 76536 US EXAM OF HEAD AND NECK: CPT

## 2024-10-11 ENCOUNTER — HOSPITAL ENCOUNTER (OUTPATIENT)
Dept: RADIOLOGY | Facility: HOSPITAL | Age: 74
Discharge: HOME | End: 2024-10-11
Payer: MEDICARE

## 2024-10-11 VITALS — BODY MASS INDEX: 21.66 KG/M2 | HEIGHT: 67 IN | WEIGHT: 138 LBS

## 2024-10-11 DIAGNOSIS — Z12.31 ENCOUNTER FOR SCREENING MAMMOGRAM FOR MALIGNANT NEOPLASM OF BREAST: ICD-10-CM

## 2024-10-11 PROCEDURE — 77067 SCR MAMMO BI INCL CAD: CPT

## 2024-11-04 ENCOUNTER — APPOINTMENT (OUTPATIENT)
Dept: PRIMARY CARE | Facility: CLINIC | Age: 74
End: 2024-11-04
Payer: MEDICARE

## 2024-11-04 ENCOUNTER — HOSPITAL ENCOUNTER (OUTPATIENT)
Dept: RADIOLOGY | Facility: HOSPITAL | Age: 74
Discharge: HOME | End: 2024-11-04
Payer: MEDICARE

## 2024-11-04 VITALS
DIASTOLIC BLOOD PRESSURE: 80 MMHG | BODY MASS INDEX: 21.58 KG/M2 | WEIGHT: 137.8 LBS | HEART RATE: 65 BPM | OXYGEN SATURATION: 98 % | TEMPERATURE: 97.8 F | SYSTOLIC BLOOD PRESSURE: 120 MMHG

## 2024-11-04 DIAGNOSIS — E78.00 HYPERCHOLESTEREMIA: ICD-10-CM

## 2024-11-04 DIAGNOSIS — M79.605 PAIN OF LEFT LOWER EXTREMITY: ICD-10-CM

## 2024-11-04 DIAGNOSIS — I77.3 FIBROMUSCULAR DYSPLASIA (CMS-HCC): ICD-10-CM

## 2024-11-04 DIAGNOSIS — Z71.2 ENCOUNTER TO DISCUSS TEST RESULTS: Primary | ICD-10-CM

## 2024-11-04 DIAGNOSIS — F43.10 PTSD (POST-TRAUMATIC STRESS DISORDER): ICD-10-CM

## 2024-11-04 DIAGNOSIS — K21.9 GASTROESOPHAGEAL REFLUX DISEASE WITHOUT ESOPHAGITIS: ICD-10-CM

## 2024-11-04 PROCEDURE — G0446 INTENS BEHAVE THER CARDIO DX: HCPCS | Performed by: INTERNAL MEDICINE

## 2024-11-04 PROCEDURE — 1036F TOBACCO NON-USER: CPT | Performed by: INTERNAL MEDICINE

## 2024-11-04 PROCEDURE — 93971 EXTREMITY STUDY: CPT | Performed by: RADIOLOGY

## 2024-11-04 PROCEDURE — 93971 EXTREMITY STUDY: CPT

## 2024-11-04 PROCEDURE — 99214 OFFICE O/P EST MOD 30 MIN: CPT | Performed by: INTERNAL MEDICINE

## 2024-11-04 PROCEDURE — 1160F RVW MEDS BY RX/DR IN RCRD: CPT | Performed by: INTERNAL MEDICINE

## 2024-11-04 PROCEDURE — 1159F MED LIST DOCD IN RCRD: CPT | Performed by: INTERNAL MEDICINE

## 2024-11-04 PROCEDURE — G2211 COMPLEX E/M VISIT ADD ON: HCPCS | Performed by: INTERNAL MEDICINE

## 2024-11-04 RX ORDER — DULOXETIN HYDROCHLORIDE 60 MG/1
CAPSULE, DELAYED RELEASE ORAL
Qty: 90 CAPSULE | Refills: 0 | Status: SHIPPED | OUTPATIENT
Start: 2024-11-04

## 2024-11-04 ASSESSMENT — ENCOUNTER SYMPTOMS: LEG PAIN: 1
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
## 2024-11-06 ENCOUNTER — OFFICE VISIT (OUTPATIENT)
Dept: URGENT CARE | Facility: URGENT CARE | Age: 74
End: 2024-11-06
Payer: MEDICARE

## 2024-11-06 ENCOUNTER — HOSPITAL ENCOUNTER (OUTPATIENT)
Dept: RADIOLOGY | Facility: CLINIC | Age: 74
Discharge: HOME | End: 2024-11-06
Payer: MEDICARE

## 2024-11-06 VITALS
RESPIRATION RATE: 16 BRPM | BODY MASS INDEX: 21.72 KG/M2 | TEMPERATURE: 97.1 F | OXYGEN SATURATION: 97 % | SYSTOLIC BLOOD PRESSURE: 107 MMHG | HEART RATE: 86 BPM | WEIGHT: 138.67 LBS | DIASTOLIC BLOOD PRESSURE: 70 MMHG

## 2024-11-06 DIAGNOSIS — J40 BRONCHITIS: ICD-10-CM

## 2024-11-06 DIAGNOSIS — U07.1 COVID: ICD-10-CM

## 2024-11-06 DIAGNOSIS — J40 BRONCHITIS: Primary | ICD-10-CM

## 2024-11-06 PROCEDURE — 1159F MED LIST DOCD IN RCRD: CPT | Performed by: PHYSICIAN ASSISTANT

## 2024-11-06 PROCEDURE — 71046 X-RAY EXAM CHEST 2 VIEWS: CPT | Mod: FOREIGN READ | Performed by: RADIOLOGY

## 2024-11-06 PROCEDURE — 99213 OFFICE O/P EST LOW 20 MIN: CPT | Performed by: PHYSICIAN ASSISTANT

## 2024-11-06 PROCEDURE — 71046 X-RAY EXAM CHEST 2 VIEWS: CPT

## 2024-11-06 PROCEDURE — 1160F RVW MEDS BY RX/DR IN RCRD: CPT | Performed by: PHYSICIAN ASSISTANT

## 2024-11-06 PROCEDURE — 1036F TOBACCO NON-USER: CPT | Performed by: PHYSICIAN ASSISTANT

## 2024-11-06 ASSESSMENT — ENCOUNTER SYMPTOMS: COUGH: 1

## 2024-11-06 NOTE — PATIENT INSTRUCTIONS
Covid Infection- Rapid Covid swab positive. Precautions reviewed: Quarantine 5 days or until fever free 24 hours then Mask 5 days or until cough is improving. Go to ER if chest pain or difficulty breathing occurs. Encourage hydration with water or Pedialyte if vomiting present, OTC expectorant such as Mucinex or Robitussin with plenty of fluids, Tylenol as needed for pain, throat lozenges for sore throat.  Chest xray reviewed, negative for pneumonia.

## 2024-11-06 NOTE — PROGRESS NOTES
Subjective   Patient ID: Marsha Duncan is a 74 y.o. female. They present today with a chief complaint of Cough (Patient states she tested positive for COVID this morning sick for 3 days ).    History of Present Illness    Cough      Pt reports symptoms started with headache Sunday, wet sounding cough started on Monday and worse today. No wheeze or SOB or fever. No vomiting or diarrhea. Nausea yesterday with decreased appetite. Pt is not drinking water will occasionally take gatorade.   Past Medical History  Allergies as of 11/06/2024 - Reviewed 11/06/2024   Allergen Reaction Noted    Penicillins Rash and Unknown 12/01/2012    Latex Itching, Rash, and Other 12/01/2012    Oxaprozin Diarrhea, Rash, and Unknown 12/01/2012    Adhesive Other 08/16/2023    Adhesive tape-silicones Unknown 03/27/2023    Penicillin Rash 08/16/2023       (Not in a hospital admission)       Past Medical History:   Diagnosis Date    Accidental fall 03/27/2023    Ankle injury 03/27/2023    Aortic atherosclerosis (CMS-HCC)     Cough 03/27/2023    Fibromuscular dysplasia (CMS-HCC)     Laceration of forearm 03/27/2023    Nontoxic single thyroid nodule 04/15/2019    Thyroid nodule    Personal history of other infectious and parasitic diseases 10/06/2013    History of pseudomembranous enterocolitis    Personal history of other specified conditions 06/22/2020    History of abdominal pain    Unspecified cataract     Cataracts, bilateral    UTI (urinary tract infection) 06/23/2023    Varicose veins of both lower extremities        Past Surgical History:   Procedure Laterality Date    COLONOSCOPY  08/08/2022    Complete Colonoscopy    COLONOSCOPY  08/08/2022    Complete Colonoscopy    COLONOSCOPY  08/08/2022    Complete Colonoscopy    CT ABDOMEN PELVIS ANGIOGRAM W AND/OR WO IV CONTRAST  6/19/2023    CT ABDOMEN PELVIS ANGIOGRAM W AND/OR WO IV CONTRAST 6/19/2023 GEN CT    CT ANGIO NECK  3/16/2023    CT NECK ANGIO W AND WO IV CONTRAST [facility]    KIDNEY SURGERY   04/25/2014    Kidney Surgery    KNEE SURGERY  04/25/2014    Knee Surgery    MR HEAD ANGIO WO IV CONTRAST  10/26/2023    MR HEAD ANGIO WO IV CONTRAST 10/26/2023 [facility]    OOPHORECTOMY  04/25/2014    Oophorectomy        reports that she has never smoked. She has never been exposed to tobacco smoke. She has never used smokeless tobacco. She reports that she does not drink alcohol and does not use drugs.    Review of Systems  Review of Systems   Respiratory:  Positive for cough.                                   Objective    Vitals:    11/06/24 1218   BP: 107/70   BP Location: Left arm   Patient Position: Sitting   Pulse: 86   Resp: 16   Temp: 36.2 °C (97.1 °F)   TempSrc: Oral   SpO2: 97%   Weight: 62.9 kg (138 lb 10.7 oz)     No LMP recorded. (Menstrual status: Oopherectomy).    Physical Exam  Vitals and nursing note reviewed.   Constitutional:       Appearance: Normal appearance.      Comments: Pleasant white female, tired appearing   HENT:      Head: Normocephalic and atraumatic.      Right Ear: Tympanic membrane, ear canal and external ear normal.      Left Ear: Tympanic membrane, ear canal and external ear normal.      Nose: Congestion and rhinorrhea present.      Mouth/Throat:      Mouth: Mucous membranes are moist.      Pharynx: Posterior oropharyngeal erythema present.      Comments: Cobbling, no exudate  Eyes:      Extraocular Movements: Extraocular movements intact.      Conjunctiva/sclera: Conjunctivae normal.      Pupils: Pupils are equal, round, and reactive to light.      Comments: Wearing glasses, wandering eyes b/l   Cardiovascular:      Rate and Rhythm: Normal rate and regular rhythm.      Heart sounds: No murmur heard.  Pulmonary:      Effort: Pulmonary effort is normal.      Breath sounds: No wheezing.      Comments: Diminished breath sounds at bases, crackles LLL, dry spasmatic cough  Musculoskeletal:         General: Normal range of motion.      Cervical back: Normal range of motion and neck  supple.   Lymphadenopathy:      Cervical: No cervical adenopathy.   Skin:     General: Skin is warm and dry.   Neurological:      General: No focal deficit present.      Mental Status: She is alert and oriented to person, place, and time.         Procedures    Point of Care Test & Imaging Results from this visit  No results found for this visit on 11/06/24.     Narrative & Impression   STUDY:  Chest Radiographs;  11/06/2024, 12:46PM  INDICATION:  Covid-19 positive today, spasmatic cough left lower lobe.  COMPARISON:  03/13/2023 CT chest abdomen and pelvis  ACCESSION NUMBER(S):  [id number]  ORDERING CLINICIAN:  LULU STOKES  TECHNIQUE:  Frontal and lateral chest.   FINDINGS:  CARDIOMEDIASTINAL SILHOUETTE:  The cardiac silhouette is normal in size and configuration.  There is  mild residual thickening at the site of the previously noted depressed  sternal fracture.     LUNGS:  Lungs are clear.     ABDOMEN:  No remarkable upper abdominal findings.     BONES:  No acute osseous changes.  Old sternal fracture and old right rib  fractures.  IMPRESSION:  1.Normal heart size with no radiographic signs of active  pulmonary parenchymal infiltration.  2.Mild residual thickening at the site of the previously noted  sternal fracture.  Signed by Amara Humphries DO     Diagnostic study results (if any) were reviewed by Lulu Stokes PA-C.    Assessment/Plan   Allergies, medications, history, and pertinent labs/EKGs/Imaging reviewed by Lulu Stokes PA-C.     Medical Decision Making  75 yo F presents with cough, headache, nausea, decreased appetite x 3-4 days, tested positive for covid with home test this morning. Reviewed vitals stable. Exam remarkable for tired appearing female, nasal congestion, pharyngeal erythema with cobbling, diminished breath sounds and crackles LLL. Discussed COVID infection precautions and supportive care. Encouraged hydration with water or Pedialyte. Reviewed chest xray negative  for pneumonia.    Orders and Diagnoses  Diagnoses and all orders for this visit:  Bronchitis  -     XR chest 2 views; Future  COVID      Medical Admin Record      Patient disposition: Home    Electronically signed by Shari Stokes PA-C  1:47 PM

## 2024-11-07 ENCOUNTER — TELEPHONE (OUTPATIENT)
Dept: PRIMARY CARE | Facility: CLINIC | Age: 74
End: 2024-11-07
Payer: COMMERCIAL

## 2024-11-07 NOTE — TELEPHONE ENCOUNTER
Patient called, was seen 11/4, went to Urgent Care yesterday, told she has bronchitis. Patient has COVID tested herself twice and is positive. Said she is doing ok other than being tired and cough    Told her to keep hydrated, rest, try Mucinex

## 2024-11-12 ENCOUNTER — APPOINTMENT (OUTPATIENT)
Dept: PRIMARY CARE | Facility: CLINIC | Age: 74
End: 2024-11-12
Payer: MEDICARE

## 2024-11-25 ENCOUNTER — APPOINTMENT (OUTPATIENT)
Dept: AUDIOLOGY | Facility: CLINIC | Age: 74
End: 2024-11-25
Payer: MEDICARE

## 2024-11-25 DIAGNOSIS — H90.3 SENSORINEURAL HEARING LOSS (SNHL) OF BOTH EARS: Primary | ICD-10-CM

## 2024-11-25 PROCEDURE — 92550 TYMPANOMETRY & REFLEX THRESH: CPT | Performed by: AUDIOLOGIST

## 2024-11-25 PROCEDURE — 92557 COMPREHENSIVE HEARING TEST: CPT | Performed by: AUDIOLOGIST

## 2024-12-03 NOTE — PROGRESS NOTES
AUDIOLOGY ADULT AUDIOMETRIC EVALUATION    Name:  Marsha Duncan  :  1950  Age:  74 y.o.  Date of Evaluation:  2024    Reason for visit: Marsha is seen in the clinic today at the request of otolaryngology for an audiologic evaluation.     HISTORY  Patient has history of significant bilateral sensorineural hearing loss.   She reports for a long time she had lost one hearing aid; now she lost the other hearing aid; under care of audiologist Evette Whitney MA.   She denies any ringing.   No imbalance or dizziness reported.      EVALUATION  See scanned audiogram: “Media” > “Audiology Report”.      RESULTS  Otoscopic Evaluation:  Right Ear: clear ear canal  Left Ear: clear ear canal    Immittance Measures:  Tympanometry:  Right Ear: Type A, normal tympanic membrane mobility with normal middle ear pressure  Left Ear: Type A, normal tympanic membrane mobility with normal middle ear pressure    Acoustic Reflexes:  Ipsilateral Right Ear: Absent   Ipsilateral Left Ear: Absent  Contralateral Right Ear: did not evaluate  Contralateral Left Ear: did not evaluate    Audiometry:  Test Technique and Reliability:   Standard audiometry via supra-aural headphones and insert phones  Reliability is good.    Pure tone air and bone conduction audiometry:  Right Ear: Mild sensorineural hearing loss through 1500Hz dropping to moderate 2KHz and above   Left Ear: Mild sensorineural hearing loss through 1500Hz dropping to moderate 2KHz and above     Speech Audiometry (Word Recognition Scores):   Right Ear: Excellent at most comfortable listening level of loudness of 80dBHL  Left Ear: Excellent at most comfortable listening level of loudness of 80dBHL    IMPRESSIONS    Mild to moderate sensorineural hearing loss bilaterally      RECOMMENDATIONS  - Hearing aid evaluation; patient lives in [place] and prefers [facility] office  - Annual audiologic evaluation, sooner if an acute change is noted.    PATIENT EDUCATION  Discussed  results, impressions and recommendations with the patient. Questions were addressed and the patient was encouraged to contact our office should concerns arise.    Time for this encounter: 340/420    Karyn Eid M.A., CCC/A   Licensed Audiologist

## 2024-12-31 ENCOUNTER — APPOINTMENT (OUTPATIENT)
Dept: AUDIOLOGY | Facility: CLINIC | Age: 74
End: 2024-12-31
Payer: COMMERCIAL

## 2025-01-06 ENCOUNTER — APPOINTMENT (OUTPATIENT)
Dept: AUDIOLOGY | Facility: CLINIC | Age: 75
End: 2025-01-06

## 2025-01-06 DIAGNOSIS — H90.3 SENSORINEURAL HEARING LOSS (SNHL) OF BOTH EARS: Primary | ICD-10-CM

## 2025-01-08 ENCOUNTER — TELEPHONE (OUTPATIENT)
Dept: PRIMARY CARE | Facility: CLINIC | Age: 75
End: 2025-01-08
Payer: MEDICARE

## 2025-01-08 NOTE — TELEPHONE ENCOUNTER
Having issues taking Duloxetine. She says she has been having issues with this since about thanksgiving. Been having abdominal pain and GI issues. Has not been taking it for a couple days- stated that it feels that she is in outer space. Stopped the medication cold turkey.     Okay with not starting something else. Started this medication due to PTSD. Has worked her way through it. If you want to discuss this with her, she does not feel she is okay to drive. Feels like her head is spinning.

## 2025-01-16 ENCOUNTER — APPOINTMENT (OUTPATIENT)
Dept: DERMATOLOGY | Facility: CLINIC | Age: 75
End: 2025-01-16
Payer: COMMERCIAL

## 2025-01-16 DIAGNOSIS — Z12.83 SCREENING EXAM FOR SKIN CANCER: Primary | ICD-10-CM

## 2025-01-16 DIAGNOSIS — D18.01 HEMANGIOMA OF SKIN: ICD-10-CM

## 2025-01-16 DIAGNOSIS — L57.0 ACTINIC KERATOSIS: ICD-10-CM

## 2025-01-16 DIAGNOSIS — L82.1 SEBORRHEIC KERATOSIS: ICD-10-CM

## 2025-01-16 PROCEDURE — 17003 DESTRUCT PREMALG LES 2-14: CPT | Performed by: NURSE PRACTITIONER

## 2025-01-16 PROCEDURE — 99203 OFFICE O/P NEW LOW 30 MIN: CPT | Performed by: NURSE PRACTITIONER

## 2025-01-16 PROCEDURE — 1036F TOBACCO NON-USER: CPT | Performed by: NURSE PRACTITIONER

## 2025-01-16 PROCEDURE — 17000 DESTRUCT PREMALG LESION: CPT | Performed by: NURSE PRACTITIONER

## 2025-01-16 PROCEDURE — 1159F MED LIST DOCD IN RCRD: CPT | Performed by: NURSE PRACTITIONER

## 2025-01-16 NOTE — PROGRESS NOTES
Subjective     Marsha Duncan is a 74 y.o. female who presents for the following: Skin Check (Pt presents for waist up skin exam.  Pt states she has lesions of concern on the face, arms, chest and scalp.  Pt reports history of actinic keratosis and squamous cell carcinoma. ).     Review of Systems:  No other skin or systemic complaints other than what is documented elsewhere in the note.    The following portions of the chart were reviewed this encounter and updated as appropriate:       Skin Cancer History  No skin cancer on file.    Specialty Problems    None    Past Medical History:  Marsha Duncan  has a past medical history of Accidental fall (03/27/2023), Ankle injury (03/27/2023), Aortic atherosclerosis (CMS-HCC), Cough (03/27/2023), Fibromuscular dysplasia (CMS-HCC), Laceration of forearm (03/27/2023), Nontoxic single thyroid nodule (04/15/2019), Personal history of other infectious and parasitic diseases (10/06/2013), Personal history of other specified conditions (06/22/2020), Unspecified cataract, UTI (urinary tract infection) (06/23/2023), and Varicose veins of both lower extremities.    Past Surgical History:  Marsha Duncan  has a past surgical history that includes Kidney surgery (04/25/2014); Oophorectomy (04/25/2014); Knee surgery (04/25/2014); Colonoscopy (08/08/2022); Colonoscopy (08/08/2022); Colonoscopy (08/08/2022); CT angio neck (3/16/2023); CT angio abdomen pelvis w and or wo IV IV contrast (6/19/2023); and MR angio head wo IV contrast (10/26/2023).    Family History:  Patient family history includes Colon cancer in her mother; Stroke in her father; cardiac disorder in her mother.    Social History:  Marsha Duncan  reports that she has never smoked. She has never been exposed to tobacco smoke. She has never used smokeless tobacco. She reports that she does not drink alcohol and does not use drugs.    Allergies:  Penicillins, Latex, Oxaprozin, Adhesive, Adhesive tape-silicones, and  Penicillin    Current Medications / CAM's:    Current Outpatient Medications:     acetaminophen (Tylenol) 500 mg tablet, Take by mouth every 8 hours., Disp: , Rfl:     aspirin 81 mg EC tablet, Take by mouth., Disp: , Rfl:     DULoxetine (Cymbalta) 60 mg DR capsule, TAKE 1 CAPSULE BY MOUTH EVERY DAY, Disp: 90 capsule, Rfl: 0    multivitamin capsule, Take 1 capsule by mouth once daily. (Patient not taking: Reported on 11/6/2024), Disp: , Rfl:     vitamins A,C,E-zinc-copper (PreserVision AREDS) 2,148 mcg-113 mg-45 mg-17.4mg tablet, Take by mouth., Disp: , Rfl:      Objective   Well appearing patient in no apparent distress; mood and affect are within normal limits.    A focused skin examination was performed, waist up on the body. Declined total body exam. All findings within normal limits unless otherwise noted below.    Assessment/Plan   1. Screening exam for skin cancer  Scattered benign lesions. Scar(s) clear no sign of recurrence.     No evidence of recurrence in scar and benign ROS.   Continue with regular total body skin exams.  ABCDEs of melanoma and atypical moles were discussed with the patient.  Patient was instructed to perform monthly self skin examination.  We recommended that the patient have regular full skin exams given an increased risk of subsequent skin cancers.  The patient was instructed to use sun protective behaviors including use of broad spectrum sunscreens and sun protective clothing to reduce risk of skin cancers.    2. Seborrheic keratosis  Stuck on verrucous, tan-brown papules and plaques.      Although Seborrheic Keratoses can be troublesome and unsightly, they are entirely benign.  Removal of Seborrheic Keratoses is considered a cosmetic procedure. Removal is typically performed using liquid nitrogen cryotherapy.  Treatment of current lesions does not prevent the development of new Seborrheic Keratoses in the future.    3. Hemangioma of skin  Violaceous/red papule with maroon lagoons      - A cherry hemangioma is a small macule (small, flat, smooth area) or papule (small, solid bump) formed from an overgrowth of tiny blood vessels in the skin. Cherry hemangiomas are characteristically red or purplish in color. They often first appear in middle adulthood and usually increase in number with age. Cherry hemangiomas are noncancerous (benign) and are common in adults.  - Lesions are benign, reassured patient.     4. Actinic keratosis (4)  Left Forearm - Posterior (2), Mid Upper Vermilion Lip, Right Buccal Cheek  Erythematous scaly macule(s)    -Discussed nature of diagnosis and treatment options.   -Patient wishes to proceed with Cryotherapy today  -Possible side effects of liquid nitrogen treatment reviewed including formation of blisters, crusting, tenderness, scar, and discoloration which may be permanent.  -Patient advised to return the office for re-evaluation if the treated lesion(s) do not resolve within 4-6 weeks. Patient verbalizes understanding.    Destr of lesion - Left Forearm - Posterior (2), Mid Upper Vermilion Lip, Right Buccal Cheek  Complexity: simple    Destruction method: cryotherapy    Informed consent: discussed and consent obtained    Lesion destroyed using liquid nitrogen: Yes    Region frozen until ice ball extended beyond lesion: Yes    Cryotherapy cycles:  1  Outcome: patient tolerated procedure well with no complications    Post-procedure details: wound care instructions given      Follow up in 6 months for a total body skin exam. Please call me if there are any changes or development of concerning symptoms (lesion/skin condition is changing, bleeding, enlarging, or worsening).

## 2025-02-04 ENCOUNTER — APPOINTMENT (OUTPATIENT)
Dept: PRIMARY CARE | Facility: CLINIC | Age: 75
End: 2025-02-04
Payer: COMMERCIAL

## 2025-02-04 VITALS
HEART RATE: 70 BPM | BODY MASS INDEX: 22.46 KG/M2 | WEIGHT: 143.4 LBS | TEMPERATURE: 97.7 F | SYSTOLIC BLOOD PRESSURE: 138 MMHG | DIASTOLIC BLOOD PRESSURE: 80 MMHG | OXYGEN SATURATION: 98 %

## 2025-02-04 DIAGNOSIS — M06.9 RHEUMATOID ARTHRITIS INVOLVING MULTIPLE SITES, UNSPECIFIED WHETHER RHEUMATOID FACTOR PRESENT (MULTI): ICD-10-CM

## 2025-02-04 DIAGNOSIS — K21.9 GASTROESOPHAGEAL REFLUX DISEASE WITHOUT ESOPHAGITIS: ICD-10-CM

## 2025-02-04 DIAGNOSIS — S22.20XS CLOSED FRACTURE OF STERNUM, UNSPECIFIED PORTION OF STERNUM, SEQUELA: ICD-10-CM

## 2025-02-04 DIAGNOSIS — M54.2 NECK PAIN: Primary | ICD-10-CM

## 2025-02-04 DIAGNOSIS — F43.10 PTSD (POST-TRAUMATIC STRESS DISORDER): ICD-10-CM

## 2025-02-04 DIAGNOSIS — E78.00 HYPERCHOLESTEREMIA: ICD-10-CM

## 2025-02-04 PROCEDURE — 1036F TOBACCO NON-USER: CPT | Performed by: INTERNAL MEDICINE

## 2025-02-04 PROCEDURE — 99214 OFFICE O/P EST MOD 30 MIN: CPT | Performed by: INTERNAL MEDICINE

## 2025-02-04 PROCEDURE — 1160F RVW MEDS BY RX/DR IN RCRD: CPT | Performed by: INTERNAL MEDICINE

## 2025-02-04 PROCEDURE — 1159F MED LIST DOCD IN RCRD: CPT | Performed by: INTERNAL MEDICINE

## 2025-02-04 PROCEDURE — G2211 COMPLEX E/M VISIT ADD ON: HCPCS | Performed by: INTERNAL MEDICINE

## 2025-02-04 RX ORDER — DULOXETIN HYDROCHLORIDE 30 MG/1
CAPSULE, DELAYED RELEASE ORAL
Qty: 90 CAPSULE | Refills: 0 | Status: SHIPPED | OUTPATIENT
Start: 2025-02-04

## 2025-02-04 ASSESSMENT — ENCOUNTER SYMPTOMS
INSOMNIA: 1
NECK PAIN: 1
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
## 2025-02-08 ENCOUNTER — OFFICE VISIT (OUTPATIENT)
Dept: URGENT CARE | Facility: URGENT CARE | Age: 75
End: 2025-02-08
Payer: MEDICARE

## 2025-02-08 ENCOUNTER — ANCILLARY PROCEDURE (OUTPATIENT)
Dept: URGENT CARE | Facility: URGENT CARE | Age: 75
End: 2025-02-08
Payer: MEDICARE

## 2025-02-08 ENCOUNTER — ANCILLARY PROCEDURE (OUTPATIENT)
Dept: URGENT CARE | Facility: URGENT CARE | Age: 75
End: 2025-02-08
Payer: COMMERCIAL

## 2025-02-08 VITALS
HEART RATE: 70 BPM | OXYGEN SATURATION: 98 % | TEMPERATURE: 98.3 F | RESPIRATION RATE: 18 BRPM | DIASTOLIC BLOOD PRESSURE: 80 MMHG | SYSTOLIC BLOOD PRESSURE: 127 MMHG

## 2025-02-08 DIAGNOSIS — S00.03XA CONTUSION OF SCALP, INITIAL ENCOUNTER: ICD-10-CM

## 2025-02-08 DIAGNOSIS — R52 PAIN: ICD-10-CM

## 2025-02-08 DIAGNOSIS — T14.90XA INJURY: ICD-10-CM

## 2025-02-08 DIAGNOSIS — S63.501A SPRAIN OF RIGHT WRIST, INITIAL ENCOUNTER: Primary | ICD-10-CM

## 2025-02-08 PROCEDURE — 73130 X-RAY EXAM OF HAND: CPT | Mod: RIGHT SIDE | Performed by: PHYSICIAN ASSISTANT

## 2025-02-08 ASSESSMENT — ENCOUNTER SYMPTOMS: HEADACHES: 1
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
## 2025-02-09 NOTE — PATIENT INSTRUCTIONS
Right wrist sprain- Reviewed xray of right hand and wrist, negative for fracture. Wear wrist splint for next 3-5 days; may remove to shower. ICE compresses 15 min every 2 hours while awake x 24 hours, elevate extremity above heart often to help prevent swelling, pain control with Tylenol 500mg every 6 hr as needed.   Follow up with PCP if not improving in 3 days. GO to ER if worsening pain or swelling or weakness or numbness or skin changes.     Head contusion- no cephalohematoma. Monitor for concussion symptoms such as headache, dizziness, nausea, abdominal pain. Avoid screen time for 24 hours. Limit exertion for couple days. Encouraged rest.   GO TO ER if severe headache or dizziness or vomiting in next 24-48 hours which could be sign of brain bleed.     done

## 2025-02-11 ENCOUNTER — HOSPITAL ENCOUNTER (OUTPATIENT)
Dept: VASCULAR MEDICINE | Facility: HOSPITAL | Age: 75
Discharge: HOME | End: 2025-02-11
Payer: MEDICARE

## 2025-02-11 ENCOUNTER — OFFICE VISIT (OUTPATIENT)
Dept: CARDIOLOGY | Facility: HOSPITAL | Age: 75
End: 2025-02-11
Payer: MEDICARE

## 2025-02-11 VITALS
SYSTOLIC BLOOD PRESSURE: 121 MMHG | WEIGHT: 145 LBS | DIASTOLIC BLOOD PRESSURE: 73 MMHG | BODY MASS INDEX: 22.76 KG/M2 | OXYGEN SATURATION: 98 % | HEART RATE: 60 BPM | HEIGHT: 67 IN

## 2025-02-11 DIAGNOSIS — I77.3 FIBROMUSCULAR DYSPLASIA (CMS-HCC): ICD-10-CM

## 2025-02-11 DIAGNOSIS — I65.23 BILATERAL CAROTID ARTERY STENOSIS: ICD-10-CM

## 2025-02-11 DIAGNOSIS — I65.21 ATHEROSCLEROSIS OF RIGHT CAROTID ARTERY: Primary | ICD-10-CM

## 2025-02-11 PROCEDURE — 99214 OFFICE O/P EST MOD 30 MIN: CPT | Performed by: INTERNAL MEDICINE

## 2025-02-11 PROCEDURE — 93880 EXTRACRANIAL BILAT STUDY: CPT

## 2025-02-11 PROCEDURE — 1159F MED LIST DOCD IN RCRD: CPT | Performed by: INTERNAL MEDICINE

## 2025-02-11 PROCEDURE — 99214 OFFICE O/P EST MOD 30 MIN: CPT | Mod: 25 | Performed by: INTERNAL MEDICINE

## 2025-02-11 PROCEDURE — 3008F BODY MASS INDEX DOCD: CPT | Performed by: INTERNAL MEDICINE

## 2025-02-11 PROCEDURE — 93880 EXTRACRANIAL BILAT STUDY: CPT | Performed by: SURGERY
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
## 2025-02-11 NOTE — PATIENT INSTRUCTIONS
If you do not want to take a statin can try Cholestoff, a non statin plant stanol supplement.    See you in 1 year with carotid duplex.    Earlene Reynolds MD
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
## 2025-05-08 ENCOUNTER — APPOINTMENT (OUTPATIENT)
Dept: PRIMARY CARE | Facility: CLINIC | Age: 75
End: 2025-05-08
Payer: COMMERCIAL

## 2025-05-08 VITALS
DIASTOLIC BLOOD PRESSURE: 72 MMHG | OXYGEN SATURATION: 97 % | SYSTOLIC BLOOD PRESSURE: 126 MMHG | BODY MASS INDEX: 23.15 KG/M2 | WEIGHT: 147.8 LBS | HEART RATE: 61 BPM | TEMPERATURE: 97.5 F

## 2025-05-08 DIAGNOSIS — M19.90 ARTHRITIS: Primary | ICD-10-CM

## 2025-05-08 DIAGNOSIS — F43.10 PTSD (POST-TRAUMATIC STRESS DISORDER): ICD-10-CM

## 2025-05-08 DIAGNOSIS — M54.2 NECK PAIN: ICD-10-CM

## 2025-05-08 DIAGNOSIS — E78.00 HYPERCHOLESTEREMIA: ICD-10-CM

## 2025-05-08 DIAGNOSIS — K21.9 GASTROESOPHAGEAL REFLUX DISEASE WITHOUT ESOPHAGITIS: ICD-10-CM

## 2025-05-08 DIAGNOSIS — E04.1 THYROID NODULE: ICD-10-CM

## 2025-05-08 PROCEDURE — G2211 COMPLEX E/M VISIT ADD ON: HCPCS | Performed by: INTERNAL MEDICINE

## 2025-05-08 PROCEDURE — 1160F RVW MEDS BY RX/DR IN RCRD: CPT | Performed by: INTERNAL MEDICINE

## 2025-05-08 PROCEDURE — 1036F TOBACCO NON-USER: CPT | Performed by: INTERNAL MEDICINE

## 2025-05-08 PROCEDURE — 1159F MED LIST DOCD IN RCRD: CPT | Performed by: INTERNAL MEDICINE

## 2025-05-08 PROCEDURE — 99214 OFFICE O/P EST MOD 30 MIN: CPT | Performed by: INTERNAL MEDICINE

## 2025-05-08 RX ORDER — DULOXETIN HYDROCHLORIDE 30 MG/1
CAPSULE, DELAYED RELEASE ORAL
Qty: 90 CAPSULE | Refills: 0 | Status: SHIPPED | OUTPATIENT
Start: 2025-05-08

## 2025-05-08 ASSESSMENT — ENCOUNTER SYMPTOMS: HIP PAIN: 1
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
## 2025-05-28 ENCOUNTER — NURSE TRIAGE (OUTPATIENT)
Dept: AUDIOLOGY | Facility: CLINIC | Age: 75
End: 2025-05-28
Payer: COMMERCIAL

## 2025-05-28 NOTE — TELEPHONE ENCOUNTER
Went to clean hearing aids and dome stuck to hearing aid and patient was afraid of hurting . Patient will come in for wax guard and dome change.

## 2025-06-06 ENCOUNTER — TELEPHONE (OUTPATIENT)
Dept: AUDIOLOGY | Facility: CLINIC | Age: 75
End: 2025-06-06
Payer: COMMERCIAL

## 2025-06-06 NOTE — TELEPHONE ENCOUNTER
Marsha purchased [place] hearing aids from [facility] in the past 6 months. They recently went out of business and she has been struggling with the hearing aids. Left hearing aid recently was lost and she would like to place an order for a loss and damage replacement. She will call with serial numbers of hearing aids once she can locate them and once she is ready to place an order for the replacement.

## 2025-07-08 ENCOUNTER — APPOINTMENT (OUTPATIENT)
Dept: AUDIOLOGY | Facility: CLINIC | Age: 75
End: 2025-07-08

## 2025-07-14 ENCOUNTER — APPOINTMENT (OUTPATIENT)
Dept: DERMATOLOGY | Facility: CLINIC | Age: 75
End: 2025-07-14
Payer: MEDICARE

## 2025-08-05 ENCOUNTER — APPOINTMENT (OUTPATIENT)
Dept: AUDIOLOGY | Facility: CLINIC | Age: 75
End: 2025-08-05

## 2025-08-10 DIAGNOSIS — F43.10 PTSD (POST-TRAUMATIC STRESS DISORDER): ICD-10-CM

## 2025-08-11 ENCOUNTER — APPOINTMENT (OUTPATIENT)
Dept: PRIMARY CARE | Facility: CLINIC | Age: 75
End: 2025-08-11
Payer: COMMERCIAL

## 2025-08-11 VITALS
SYSTOLIC BLOOD PRESSURE: 130 MMHG | TEMPERATURE: 97.7 F | BODY MASS INDEX: 22.88 KG/M2 | OXYGEN SATURATION: 98 % | DIASTOLIC BLOOD PRESSURE: 80 MMHG | HEIGHT: 66 IN | WEIGHT: 142.4 LBS | HEART RATE: 62 BPM

## 2025-08-11 DIAGNOSIS — Z00.00 ENCOUNTER FOR SUBSEQUENT ANNUAL WELLNESS VISIT (AWV) IN MEDICARE PATIENT: Primary | ICD-10-CM

## 2025-08-11 DIAGNOSIS — M54.2 NECK PAIN: ICD-10-CM

## 2025-08-11 DIAGNOSIS — I77.3 FIBROMUSCULAR DYSPLASIA: ICD-10-CM

## 2025-08-11 DIAGNOSIS — K21.9 GASTROESOPHAGEAL REFLUX DISEASE WITHOUT ESOPHAGITIS: ICD-10-CM

## 2025-08-11 DIAGNOSIS — R53.83 OTHER FATIGUE: ICD-10-CM

## 2025-08-11 DIAGNOSIS — Z12.31 ENCOUNTER FOR SCREENING MAMMOGRAM FOR MALIGNANT NEOPLASM OF BREAST: ICD-10-CM

## 2025-08-11 DIAGNOSIS — E55.9 VITAMIN D DEFICIENCY: ICD-10-CM

## 2025-08-11 DIAGNOSIS — M81.0 POSTMENOPAUSAL OSTEOPOROSIS: ICD-10-CM

## 2025-08-11 DIAGNOSIS — E04.1 THYROID NODULE: ICD-10-CM

## 2025-08-11 DIAGNOSIS — E78.00 HYPERCHOLESTEREMIA: ICD-10-CM

## 2025-08-11 DIAGNOSIS — F43.10 PTSD (POST-TRAUMATIC STRESS DISORDER): ICD-10-CM

## 2025-08-11 PROBLEM — J20.9 ACUTE BRONCHITIS: Status: RESOLVED | Noted: 2023-05-02 | Resolved: 2025-08-11

## 2025-08-11 PROCEDURE — 1170F FXNL STATUS ASSESSED: CPT | Performed by: INTERNAL MEDICINE

## 2025-08-11 PROCEDURE — 1160F RVW MEDS BY RX/DR IN RCRD: CPT | Performed by: INTERNAL MEDICINE

## 2025-08-11 PROCEDURE — 99214 OFFICE O/P EST MOD 30 MIN: CPT | Performed by: INTERNAL MEDICINE

## 2025-08-11 PROCEDURE — 1159F MED LIST DOCD IN RCRD: CPT | Performed by: INTERNAL MEDICINE

## 2025-08-11 PROCEDURE — 1036F TOBACCO NON-USER: CPT | Performed by: INTERNAL MEDICINE

## 2025-08-11 PROCEDURE — G0439 PPPS, SUBSEQ VISIT: HCPCS | Performed by: INTERNAL MEDICINE

## 2025-08-11 PROCEDURE — 3008F BODY MASS INDEX DOCD: CPT | Performed by: INTERNAL MEDICINE

## 2025-08-11 RX ORDER — AMMONIUM LACTATE 12 G/100G
LOTION TOPICAL
COMMUNITY
Start: 2025-07-27

## 2025-08-11 RX ORDER — DULOXETIN HYDROCHLORIDE 30 MG/1
30 CAPSULE, DELAYED RELEASE ORAL
Qty: 90 CAPSULE | Refills: 0 | Status: SHIPPED | OUTPATIENT
Start: 2025-08-11

## 2025-08-11 ASSESSMENT — ACTIVITIES OF DAILY LIVING (ADL)
TAKING_MEDICATION: INDEPENDENT
DRESSING: INDEPENDENT
MANAGING_FINANCES: INDEPENDENT
DOING_HOUSEWORK: INDEPENDENT
BATHING: INDEPENDENT
GROCERY_SHOPPING: INDEPENDENT

## 2025-08-11 ASSESSMENT — PATIENT HEALTH QUESTIONNAIRE - PHQ9
SUM OF ALL RESPONSES TO PHQ9 QUESTIONS 1 AND 2: 0
1. LITTLE INTEREST OR PLEASURE IN DOING THINGS: NOT AT ALL
2. FEELING DOWN, DEPRESSED OR HOPELESS: NOT AT ALL

## 2025-10-13 ENCOUNTER — APPOINTMENT (OUTPATIENT)
Dept: RADIOLOGY | Facility: HOSPITAL | Age: 75
End: 2025-10-13
Payer: MEDICARE

## 2025-11-11 ENCOUNTER — APPOINTMENT (OUTPATIENT)
Dept: PRIMARY CARE | Facility: CLINIC | Age: 75
End: 2025-11-11
Payer: MEDICARE

## 2026-01-12 ENCOUNTER — APPOINTMENT (OUTPATIENT)
Dept: DERMATOLOGY | Facility: CLINIC | Age: 76
End: 2026-01-12
Payer: MEDICARE

## 2026-02-10 ENCOUNTER — APPOINTMENT (OUTPATIENT)
Dept: CARDIOLOGY | Facility: HOSPITAL | Age: 76
End: 2026-02-10
Payer: COMMERCIAL